# Patient Record
Sex: MALE | Race: AMERICAN INDIAN OR ALASKA NATIVE | NOT HISPANIC OR LATINO | Employment: FULL TIME | URBAN - NONMETROPOLITAN AREA
[De-identification: names, ages, dates, MRNs, and addresses within clinical notes are randomized per-mention and may not be internally consistent; named-entity substitution may affect disease eponyms.]

---

## 2019-03-21 ENCOUNTER — OFFICE VISIT (OUTPATIENT)
Dept: URGENT CARE | Facility: PHYSICIAN GROUP | Age: 39
End: 2019-03-21
Payer: MEDICAID

## 2019-03-21 VITALS
RESPIRATION RATE: 20 BRPM | TEMPERATURE: 97.8 F | HEART RATE: 116 BPM | WEIGHT: 273 LBS | SYSTOLIC BLOOD PRESSURE: 128 MMHG | BODY MASS INDEX: 35.05 KG/M2 | DIASTOLIC BLOOD PRESSURE: 76 MMHG | OXYGEN SATURATION: 98 %

## 2019-03-21 DIAGNOSIS — R68.89 FLU-LIKE SYMPTOMS: ICD-10-CM

## 2019-03-21 DIAGNOSIS — R05.9 COUGH: ICD-10-CM

## 2019-03-21 DIAGNOSIS — B96.89 ACUTE BACTERIAL SINUSITIS: ICD-10-CM

## 2019-03-21 DIAGNOSIS — J01.90 ACUTE BACTERIAL SINUSITIS: ICD-10-CM

## 2019-03-21 DIAGNOSIS — J10.1 INFLUENZA A: ICD-10-CM

## 2019-03-21 LAB
FLUAV+FLUBV AG SPEC QL IA: NORMAL
INT CON NEG: NORMAL
INT CON POS: NORMAL

## 2019-03-21 PROCEDURE — 99204 OFFICE O/P NEW MOD 45 MIN: CPT | Performed by: FAMILY MEDICINE

## 2019-03-21 PROCEDURE — 87804 INFLUENZA ASSAY W/OPTIC: CPT | Performed by: FAMILY MEDICINE

## 2019-03-21 RX ORDER — OSELTAMIVIR PHOSPHATE 75 MG/1
CAPSULE ORAL
Qty: 10 CAP | Refills: 0 | Status: ON HOLD | OUTPATIENT
Start: 2019-03-21 | End: 2019-12-22

## 2019-03-21 RX ORDER — AMOXICILLIN 875 MG/1
TABLET, COATED ORAL
Qty: 20 TAB | Refills: 0 | Status: ON HOLD | OUTPATIENT
Start: 2019-03-21 | End: 2019-12-22

## 2019-03-21 RX ORDER — CODEINE PHOSPHATE AND GUAIFENESIN 10; 100 MG/5ML; MG/5ML
SOLUTION ORAL
Qty: 200 ML | Refills: 0 | Status: SHIPPED | OUTPATIENT
Start: 2019-03-21 | End: 2019-03-29

## 2019-03-21 NOTE — PROGRESS NOTES
Chief Complaint:    Chief Complaint   Patient presents with   • Nasal Congestion     x 3 weeks    • Headache     x 4 days   • Cough     x 1 week        History of Present Illness:    Girlfriend present. This is a new problem. He has been sick for 3 weeks, has worsened in past week. He has had subjective fever, nasal symptoms with purulent mucus from nose, sore throat, and cough productive of purulent mucus. Overall at least moderate severity and getting worse. Nothing has been helpful. Amoxil works/tolerates.       Review of Systems:    Constitutional: See HPI.  Eyes: Negative for change in vision, photophobia, pain, redness, and discharge.  ENT: See HPI.  Respiratory: See HPI.  Cardiovascular: Negative for chest pain, palpitations, orthopnea, claudication, leg swelling, and PND.   Gastrointestinal: Negative for abdominal pain, nausea, vomiting, diarrhea, constipation, blood in stool, and melena.   Genitourinary: Negative for dysuria, urinary urgency, urinary frequency, hematuria, and flank pain.   Musculoskeletal: Negative for myalgias, joint pain, neck pain, and back pain.   Skin: Negative for rash and itching.   Neurological: Negative for dizziness, tingling, tremors, sensory change, speech change, focal weakness, seizures, and loss of consciousness.   Endo: Diabetic, on medication.   Heme: Does not bruise/bleed easily.   Psychiatric/Behavioral: Negative for depression, suicidal ideas, hallucinations, memory loss and substance abuse. The patient is not nervous/anxious and does not have insomnia.        Past Medical History:    Past Medical History:   Diagnosis Date   • Diabetes (HCC)    • Hypercholesteremia    • Hypertension    • Migraine    • Personal history of venous thrombosis and embolism     2004, right leg     Past Surgical History:    Past Surgical History:   Procedure Laterality Date   • INCISION AND DRAINAGE ORTHOPEDIC  2004    right leg   • APPENDECTOMY       Social History:    Social History      Social History   • Marital status: Single     Spouse name: N/A   • Number of children: N/A   • Years of education: N/A     Occupational History   • Not on file.     Social History Main Topics   • Smoking status: Never Smoker   • Smokeless tobacco: Never Used   • Alcohol use Yes      Comment: 4 beers per week   • Drug use: No   • Sexual activity: Not on file     Other Topics Concern   • Not on file     Social History Narrative    ** Merged History Encounter **          Family History:    Family History   Problem Relation Age of Onset   • Diabetes Mother    • Diabetes Father    • Alcohol/Drug Father    • Alcohol/Drug Brother      Medications:    Current Outpatient Prescriptions on File Prior to Visit   Medication Sig Dispense Refill   • metformin (GLUCOPHAGE) 500 MG Tab Take 500 mg by mouth 2 times a day, with meals.     • atenolol (TENORMIN) 50 MG Tab Take 50 mg by mouth every day.     • hydrochlorothiazide (HYDRODIURIL) 25 MG TABS Take 12.5 mg by mouth every day.     • sumatriptan (IMITREX) 50 MG TABS Take 50 mg by mouth Once PRN for Migraine.     • lisinopril (PRINIVIL) 20 MG TABS Take 20 mg by mouth every day.       No current facility-administered medications on file prior to visit.      Allergies:    No Known Allergies      Vitals:    Vitals:    03/21/19 1619   BP: 128/76   Pulse: (!) 116   Resp: 20   Temp: 36.6 °C (97.8 °F)   TempSrc: Temporal   SpO2: 98%   Weight: 123.8 kg (273 lb)       Physical Exam:    Constitutional: Vital signs reviewed. Appears well-developed and well-nourished. Occl cough. No acute distress.   Eyes: Sclera white, conjunctivae clear.   ENT: TTP bilateral maxillary sinus regions. Bilateral nasal congestion and erythematous nasal mucosa. External ears normal. External auditory canals normal without discharge. TMs translucent and non-bulging. Hearing normal. Lips/teeth are normal. Oral mucosa pink and moist. Posterior pharynx and tonsils are moderately erythematous.  Neck: Neck supple.    Cardiovascular: Tachycardic. Regular rhythm. No murmur.  Pulmonary/Chest: Respirations non-labored. Clear to auscultation bilaterally.  Lymph: Cervical nodes without tenderness or enlargement.  Musculoskeletal: Normal gait. Normal range of motion. No muscular atrophy or weakness.  Neurological: Alert and oriented to person, place, and time. Muscle tone normal. Coordination normal.   Skin: No rashes or lesions. Warm, dry, normal turgor.  Psychiatric: Normal mood and affect. Behavior is normal. Judgment and thought content normal.     Diagnostics:    POCT INFLUENZA A/B (Order #[id number]) on 3/21/19   Component Results     Component   Rapid Influenza A-B   positive A    Internal Control Positive   Valid    Internal Control Negative   Valid    Last Resulted Time   Thu Mar 21, 2019  4:56 PM       Assessment / Plan:    1. Flu-like symptoms  - POCT Influenza A/B    2. Influenza A  - oseltamivir (TAMIFLU) 75 MG Cap; 1 CAP BY MOUTH TWICE A DAY X 5 DAYS.  Dispense: 10 Cap; Refill: 0    3. Acute bacterial sinusitis  - amoxicillin (AMOXIL) 875 MG tablet; 1 TAB TWICE A DAY X 10 DAYS.  Dispense: 20 Tab; Refill: 0    4. Cough  - guaifenesin-codeine (ROBITUSSIN AC) Solution oral solution; 5-10 ML (1-2 TEASPOONS) EVERY 6 HOURS ONLY IF NEEDED FOR COUGH FOR UP TO 7 DAYS. MAY CAUSE DROWSINESS.  Dispense: 200 mL; Refill: 0      Discussed with them DDX, management options, and risks, benefits, and alternatives to treatment plan agreed upon.    Agreeable to medications prescribed.  report checked - last Rx was Hydrocodone-APAP 5-325 mg #12 on 12/19/17.    Discussed expected course of duration, time for improvement, and to seek follow-up in Emergency Room, urgent care, or with PCP if getting worse at any time or not improving within expected time frame.

## 2019-12-22 ENCOUNTER — APPOINTMENT (OUTPATIENT)
Dept: RADIOLOGY | Facility: MEDICAL CENTER | Age: 39
DRG: 392 | End: 2019-12-22
Attending: EMERGENCY MEDICINE
Payer: MEDICAID

## 2019-12-22 ENCOUNTER — HOSPITAL ENCOUNTER (INPATIENT)
Facility: MEDICAL CENTER | Age: 39
LOS: 1 days | DRG: 392 | End: 2019-12-24
Attending: EMERGENCY MEDICINE | Admitting: HOSPITALIST
Payer: MEDICAID

## 2019-12-22 DIAGNOSIS — R73.9 HYPERGLYCEMIA: ICD-10-CM

## 2019-12-22 DIAGNOSIS — R74.8 ELEVATED LIPASE: ICD-10-CM

## 2019-12-22 DIAGNOSIS — K80.20 CALCULUS OF GALLBLADDER WITHOUT CHOLECYSTITIS WITHOUT OBSTRUCTION: ICD-10-CM

## 2019-12-22 DIAGNOSIS — R10.11 RIGHT UPPER QUADRANT ABDOMINAL PAIN: ICD-10-CM

## 2019-12-22 DIAGNOSIS — K62.5 RECTAL BLEEDING: ICD-10-CM

## 2019-12-22 PROBLEM — I10 HTN (HYPERTENSION): Status: ACTIVE | Noted: 2019-12-22

## 2019-12-22 PROBLEM — E11.9 T2DM (TYPE 2 DIABETES MELLITUS) (HCC): Status: ACTIVE | Noted: 2019-12-22

## 2019-12-22 LAB
ALBUMIN SERPL BCP-MCNC: 3.7 G/DL (ref 3.2–4.9)
ALBUMIN/GLOB SERPL: 1.6 G/DL
ALP SERPL-CCNC: 129 U/L (ref 30–99)
ALT SERPL-CCNC: 28 U/L (ref 2–50)
ANION GAP SERPL CALC-SCNC: 7 MMOL/L (ref 0–11.9)
AST SERPL-CCNC: 14 U/L (ref 12–45)
BASOPHILS # BLD AUTO: 0.2 % (ref 0–1.8)
BASOPHILS # BLD: 0.01 K/UL (ref 0–0.12)
BILIRUB SERPL-MCNC: 0.6 MG/DL (ref 0.1–1.5)
BUN SERPL-MCNC: 8 MG/DL (ref 8–22)
CALCIUM SERPL-MCNC: 8.8 MG/DL (ref 8.5–10.5)
CHLORIDE SERPL-SCNC: 102 MMOL/L (ref 96–112)
CO2 SERPL-SCNC: 26 MMOL/L (ref 20–33)
CREAT SERPL-MCNC: 0.62 MG/DL (ref 0.5–1.4)
EOSINOPHIL # BLD AUTO: 0.08 K/UL (ref 0–0.51)
EOSINOPHIL NFR BLD: 1.2 % (ref 0–6.9)
ERYTHROCYTE [DISTWIDTH] IN BLOOD BY AUTOMATED COUNT: 39 FL (ref 35.9–50)
ETHANOL BLD-MCNC: 0 G/DL
GLOBULIN SER CALC-MCNC: 2.3 G/DL (ref 1.9–3.5)
GLUCOSE SERPL-MCNC: 320 MG/DL (ref 65–99)
HCT VFR BLD AUTO: 45.9 % (ref 42–52)
HGB BLD-MCNC: 15.9 G/DL (ref 14–18)
IMM GRANULOCYTES # BLD AUTO: 0.02 K/UL (ref 0–0.11)
IMM GRANULOCYTES NFR BLD AUTO: 0.3 % (ref 0–0.9)
LACTATE BLD-SCNC: 2 MMOL/L (ref 0.5–2)
LIPASE SERPL-CCNC: 125 U/L (ref 11–82)
LYMPHOCYTES # BLD AUTO: 1.85 K/UL (ref 1–4.8)
LYMPHOCYTES NFR BLD: 28.8 % (ref 22–41)
MCH RBC QN AUTO: 29.2 PG (ref 27–33)
MCHC RBC AUTO-ENTMCNC: 34.6 G/DL (ref 33.7–35.3)
MCV RBC AUTO: 84.4 FL (ref 81.4–97.8)
MONOCYTES # BLD AUTO: 0.3 K/UL (ref 0–0.85)
MONOCYTES NFR BLD AUTO: 4.7 % (ref 0–13.4)
NEUTROPHILS # BLD AUTO: 4.16 K/UL (ref 1.82–7.42)
NEUTROPHILS NFR BLD: 64.8 % (ref 44–72)
NRBC # BLD AUTO: 0 K/UL
NRBC BLD-RTO: 0 /100 WBC
PLATELET # BLD AUTO: 225 K/UL (ref 164–446)
PMV BLD AUTO: 10.1 FL (ref 9–12.9)
POTASSIUM SERPL-SCNC: 3.6 MMOL/L (ref 3.6–5.5)
PROT SERPL-MCNC: 6 G/DL (ref 6–8.2)
RBC # BLD AUTO: 5.44 M/UL (ref 4.7–6.1)
SODIUM SERPL-SCNC: 135 MMOL/L (ref 135–145)
WBC # BLD AUTO: 6.4 K/UL (ref 4.8–10.8)

## 2019-12-22 PROCEDURE — 96374 THER/PROPH/DIAG INJ IV PUSH: CPT | Mod: XU

## 2019-12-22 PROCEDURE — 700105 HCHG RX REV CODE 258: Performed by: EMERGENCY MEDICINE

## 2019-12-22 PROCEDURE — G0378 HOSPITAL OBSERVATION PER HR: HCPCS

## 2019-12-22 PROCEDURE — 74177 CT ABD & PELVIS W/CONTRAST: CPT

## 2019-12-22 PROCEDURE — 99220 PR INITIAL OBSERVATION CARE,LEVL III: CPT | Performed by: HOSPITALIST

## 2019-12-22 PROCEDURE — 83605 ASSAY OF LACTIC ACID: CPT

## 2019-12-22 PROCEDURE — 700111 HCHG RX REV CODE 636 W/ 250 OVERRIDE (IP): Performed by: EMERGENCY MEDICINE

## 2019-12-22 PROCEDURE — 83690 ASSAY OF LIPASE: CPT

## 2019-12-22 PROCEDURE — 80307 DRUG TEST PRSMV CHEM ANLYZR: CPT

## 2019-12-22 PROCEDURE — 96375 TX/PRO/DX INJ NEW DRUG ADDON: CPT

## 2019-12-22 PROCEDURE — 700117 HCHG RX CONTRAST REV CODE 255: Performed by: EMERGENCY MEDICINE

## 2019-12-22 PROCEDURE — 80053 COMPREHEN METABOLIC PANEL: CPT

## 2019-12-22 PROCEDURE — 94760 N-INVAS EAR/PLS OXIMETRY 1: CPT

## 2019-12-22 PROCEDURE — 76705 ECHO EXAM OF ABDOMEN: CPT

## 2019-12-22 PROCEDURE — 700102 HCHG RX REV CODE 250 W/ 637 OVERRIDE(OP): Performed by: HOSPITALIST

## 2019-12-22 PROCEDURE — A9270 NON-COVERED ITEM OR SERVICE: HCPCS | Performed by: HOSPITALIST

## 2019-12-22 PROCEDURE — 85025 COMPLETE CBC W/AUTO DIFF WBC: CPT

## 2019-12-22 PROCEDURE — 82962 GLUCOSE BLOOD TEST: CPT

## 2019-12-22 PROCEDURE — 99285 EMERGENCY DEPT VISIT HI MDM: CPT

## 2019-12-22 PROCEDURE — 700105 HCHG RX REV CODE 258: Performed by: HOSPITALIST

## 2019-12-22 RX ORDER — MORPHINE SULFATE 4 MG/ML
2 INJECTION, SOLUTION INTRAMUSCULAR; INTRAVENOUS
Status: DISCONTINUED | OUTPATIENT
Start: 2019-12-22 | End: 2019-12-24 | Stop reason: HOSPADM

## 2019-12-22 RX ORDER — OXYCODONE HYDROCHLORIDE 5 MG/1
5 TABLET ORAL
Status: DISCONTINUED | OUTPATIENT
Start: 2019-12-22 | End: 2019-12-24 | Stop reason: HOSPADM

## 2019-12-22 RX ORDER — PROMETHAZINE HYDROCHLORIDE 25 MG/1
12.5-25 TABLET ORAL EVERY 4 HOURS PRN
Status: DISCONTINUED | OUTPATIENT
Start: 2019-12-22 | End: 2019-12-24 | Stop reason: HOSPADM

## 2019-12-22 RX ORDER — HYDROCHLOROTHIAZIDE 12.5 MG/1
12.5 TABLET ORAL DAILY
Status: DISCONTINUED | OUTPATIENT
Start: 2019-12-23 | End: 2019-12-24 | Stop reason: HOSPADM

## 2019-12-22 RX ORDER — ONDANSETRON 2 MG/ML
4 INJECTION INTRAMUSCULAR; INTRAVENOUS ONCE
Status: COMPLETED | OUTPATIENT
Start: 2019-12-22 | End: 2019-12-22

## 2019-12-22 RX ORDER — ATORVASTATIN CALCIUM 20 MG/1
20 TABLET, FILM COATED ORAL NIGHTLY
Status: ON HOLD | COMMUNITY
End: 2019-12-24

## 2019-12-22 RX ORDER — PROMETHAZINE HYDROCHLORIDE 25 MG/1
12.5-25 SUPPOSITORY RECTAL EVERY 4 HOURS PRN
Status: DISCONTINUED | OUTPATIENT
Start: 2019-12-22 | End: 2019-12-24 | Stop reason: HOSPADM

## 2019-12-22 RX ORDER — MORPHINE SULFATE 4 MG/ML
4 INJECTION, SOLUTION INTRAMUSCULAR; INTRAVENOUS ONCE
Status: COMPLETED | OUTPATIENT
Start: 2019-12-22 | End: 2019-12-22

## 2019-12-22 RX ORDER — ONDANSETRON 4 MG/1
4 TABLET, ORALLY DISINTEGRATING ORAL EVERY 4 HOURS PRN
Status: DISCONTINUED | OUTPATIENT
Start: 2019-12-22 | End: 2019-12-24 | Stop reason: HOSPADM

## 2019-12-22 RX ORDER — SODIUM CHLORIDE, SODIUM LACTATE, POTASSIUM CHLORIDE, CALCIUM CHLORIDE 600; 310; 30; 20 MG/100ML; MG/100ML; MG/100ML; MG/100ML
2000 INJECTION, SOLUTION INTRAVENOUS ONCE
Status: COMPLETED | OUTPATIENT
Start: 2019-12-22 | End: 2019-12-23

## 2019-12-22 RX ORDER — ONDANSETRON 2 MG/ML
4 INJECTION INTRAMUSCULAR; INTRAVENOUS EVERY 4 HOURS PRN
Status: DISCONTINUED | OUTPATIENT
Start: 2019-12-22 | End: 2019-12-24 | Stop reason: HOSPADM

## 2019-12-22 RX ORDER — LISINOPRIL 20 MG/1
20 TABLET ORAL DAILY
Status: DISCONTINUED | OUTPATIENT
Start: 2019-12-23 | End: 2019-12-24 | Stop reason: HOSPADM

## 2019-12-22 RX ORDER — POLYETHYLENE GLYCOL 3350 17 G/17G
1 POWDER, FOR SOLUTION ORAL
Status: DISCONTINUED | OUTPATIENT
Start: 2019-12-22 | End: 2019-12-24 | Stop reason: HOSPADM

## 2019-12-22 RX ORDER — AMOXICILLIN 250 MG
2 CAPSULE ORAL 2 TIMES DAILY
Status: DISCONTINUED | OUTPATIENT
Start: 2019-12-22 | End: 2019-12-24 | Stop reason: HOSPADM

## 2019-12-22 RX ORDER — OXYCODONE HYDROCHLORIDE 5 MG/1
2.5 TABLET ORAL
Status: DISCONTINUED | OUTPATIENT
Start: 2019-12-22 | End: 2019-12-24 | Stop reason: HOSPADM

## 2019-12-22 RX ORDER — PROCHLORPERAZINE EDISYLATE 5 MG/ML
5-10 INJECTION INTRAMUSCULAR; INTRAVENOUS EVERY 4 HOURS PRN
Status: DISCONTINUED | OUTPATIENT
Start: 2019-12-22 | End: 2019-12-24 | Stop reason: HOSPADM

## 2019-12-22 RX ORDER — SODIUM CHLORIDE 9 MG/ML
1000 INJECTION, SOLUTION INTRAVENOUS ONCE
Status: COMPLETED | OUTPATIENT
Start: 2019-12-22 | End: 2019-12-22

## 2019-12-22 RX ORDER — SODIUM CHLORIDE 9 MG/ML
INJECTION, SOLUTION INTRAVENOUS CONTINUOUS
Status: DISCONTINUED | OUTPATIENT
Start: 2019-12-22 | End: 2019-12-24 | Stop reason: HOSPADM

## 2019-12-22 RX ORDER — BISACODYL 10 MG
10 SUPPOSITORY, RECTAL RECTAL
Status: DISCONTINUED | OUTPATIENT
Start: 2019-12-22 | End: 2019-12-24 | Stop reason: HOSPADM

## 2019-12-22 RX ORDER — HYDROMORPHONE HYDROCHLORIDE 1 MG/ML
0.5 INJECTION, SOLUTION INTRAMUSCULAR; INTRAVENOUS; SUBCUTANEOUS ONCE
Status: COMPLETED | OUTPATIENT
Start: 2019-12-22 | End: 2019-12-22

## 2019-12-22 RX ORDER — METFORMIN HYDROCHLORIDE 500 MG/1
500 TABLET, EXTENDED RELEASE ORAL DAILY
COMMUNITY

## 2019-12-22 RX ORDER — ATENOLOL 50 MG/1
50 TABLET ORAL DAILY
Status: DISCONTINUED | OUTPATIENT
Start: 2019-12-23 | End: 2019-12-24 | Stop reason: HOSPADM

## 2019-12-22 RX ADMIN — IOHEXOL 100 ML: 350 INJECTION, SOLUTION INTRAVENOUS at 19:26

## 2019-12-22 RX ADMIN — MORPHINE SULFATE 4 MG: 4 INJECTION INTRAVENOUS at 17:48

## 2019-12-22 RX ADMIN — SODIUM CHLORIDE, POTASSIUM CHLORIDE, SODIUM LACTATE AND CALCIUM CHLORIDE 2000 ML: 600; 310; 30; 20 INJECTION, SOLUTION INTRAVENOUS at 21:00

## 2019-12-22 RX ADMIN — SODIUM CHLORIDE 1000 ML: 9 INJECTION, SOLUTION INTRAVENOUS at 18:49

## 2019-12-22 RX ADMIN — OXYCODONE HYDROCHLORIDE 5 MG: 5 TABLET ORAL at 23:04

## 2019-12-22 RX ADMIN — HYDROMORPHONE HYDROCHLORIDE 0.5 MG: 1 INJECTION, SOLUTION INTRAMUSCULAR; INTRAVENOUS; SUBCUTANEOUS at 19:58

## 2019-12-22 RX ADMIN — ONDANSETRON 4 MG: 2 INJECTION INTRAMUSCULAR; INTRAVENOUS at 17:48

## 2019-12-22 RX ADMIN — SENNOSIDES AND DOCUSATE SODIUM 2 TABLET: 8.6; 5 TABLET ORAL at 21:09

## 2019-12-22 ASSESSMENT — ENCOUNTER SYMPTOMS
DEPRESSION: 0
DIARRHEA: 0
HEADACHES: 0
CHILLS: 0
VOMITING: 1
DIZZINESS: 0
PHOTOPHOBIA: 0
TINGLING: 0
COUGH: 0
SORE THROAT: 0
FEVER: 0
ABDOMINAL PAIN: 1
WHEEZING: 0
MYALGIAS: 0
NAUSEA: 1
FOCAL WEAKNESS: 0
SHORTNESS OF BREATH: 0
PALPITATIONS: 0

## 2019-12-22 ASSESSMENT — LIFESTYLE VARIABLES
TOTAL SCORE: 0
HAVE PEOPLE ANNOYED YOU BY CRITICIZING YOUR DRINKING: NO
EVER HAD A DRINK FIRST THING IN THE MORNING TO STEADY YOUR NERVES TO GET RID OF A HANGOVER: NO
TOTAL SCORE: 0
ON A TYPICAL DAY WHEN YOU DRINK ALCOHOL HOW MANY DRINKS DO YOU HAVE: 0
AVERAGE NUMBER OF DAYS PER WEEK YOU HAVE A DRINK CONTAINING ALCOHOL: 0
CONSUMPTION TOTAL: NEGATIVE
ON A TYPICAL DAY WHEN YOU DRINK ALCOHOL HOW MANY DRINKS DO YOU HAVE: 0
EVER HAD A DRINK FIRST THING IN THE MORNING TO STEADY YOUR NERVES TO GET RID OF A HANGOVER: NO
HAVE YOU EVER FELT YOU SHOULD CUT DOWN ON YOUR DRINKING: NO
HOW MANY TIMES IN THE PAST YEAR HAVE YOU HAD 5 OR MORE DRINKS IN A DAY: 0
HOW MANY TIMES IN THE PAST YEAR HAVE YOU HAD 5 OR MORE DRINKS IN A DAY: 0
DOES PATIENT WANT TO STOP DRINKING: NO
EVER FELT BAD OR GUILTY ABOUT YOUR DRINKING: NO
EVER_SMOKED: YES
PACK_YEARS: 2 MONTHS
ALCOHOL_USE: NO
TOTAL SCORE: 0
DOES PATIENT WANT TO STOP DRINKING: NO
HAVE PEOPLE ANNOYED YOU BY CRITICIZING YOUR DRINKING: NO
TOTAL SCORE: 0
EVER FELT BAD OR GUILTY ABOUT YOUR DRINKING: NO
AVERAGE NUMBER OF DAYS PER WEEK YOU HAVE A DRINK CONTAINING ALCOHOL: 0
CONSUMPTION TOTAL: NEGATIVE
HAVE YOU EVER FELT YOU SHOULD CUT DOWN ON YOUR DRINKING: NO
DO YOU DRINK ALCOHOL: NO

## 2019-12-22 ASSESSMENT — PAIN DESCRIPTION - DESCRIPTORS: DESCRIPTORS: SHOOTING

## 2019-12-22 ASSESSMENT — PATIENT HEALTH QUESTIONNAIRE - PHQ9
2. FEELING DOWN, DEPRESSED, IRRITABLE, OR HOPELESS: NOT AT ALL
1. LITTLE INTEREST OR PLEASURE IN DOING THINGS: NOT AT ALL
SUM OF ALL RESPONSES TO PHQ9 QUESTIONS 1 AND 2: 0

## 2019-12-22 ASSESSMENT — PAIN SCALES - WONG BAKER
WONGBAKER_NUMERICALRESPONSE: HURTS JUST A LITTLE BIT
WONGBAKER_NUMERICALRESPONSE: HURTS A WHOLE LOT
WONGBAKER_NUMERICALRESPONSE: HURTS EVEN MORE

## 2019-12-23 ENCOUNTER — APPOINTMENT (OUTPATIENT)
Dept: RADIOLOGY | Facility: MEDICAL CENTER | Age: 39
DRG: 392 | End: 2019-12-23
Attending: HOSPITALIST
Payer: MEDICAID

## 2019-12-23 PROBLEM — E66.812 CLASS 2 SEVERE OBESITY WITH SERIOUS COMORBIDITY IN ADULT (HCC): Status: ACTIVE | Noted: 2019-12-23

## 2019-12-23 PROBLEM — E66.01 CLASS 2 SEVERE OBESITY WITH SERIOUS COMORBIDITY IN ADULT (HCC): Status: ACTIVE | Noted: 2019-12-23

## 2019-12-23 LAB
ALBUMIN SERPL BCP-MCNC: 3.2 G/DL (ref 3.2–4.9)
ALBUMIN/GLOB SERPL: 1.5 G/DL
ALP SERPL-CCNC: 90 U/L (ref 30–99)
ALT SERPL-CCNC: 23 U/L (ref 2–50)
ANION GAP SERPL CALC-SCNC: 7 MMOL/L (ref 0–11.9)
AST SERPL-CCNC: 10 U/L (ref 12–45)
BILIRUB SERPL-MCNC: 0.7 MG/DL (ref 0.1–1.5)
BUN SERPL-MCNC: 7 MG/DL (ref 8–22)
CALCIUM SERPL-MCNC: 8.1 MG/DL (ref 8.5–10.5)
CHLORIDE SERPL-SCNC: 104 MMOL/L (ref 96–112)
CHOLEST SERPL-MCNC: 92 MG/DL (ref 100–199)
CO2 SERPL-SCNC: 26 MMOL/L (ref 20–33)
CREAT SERPL-MCNC: 0.61 MG/DL (ref 0.5–1.4)
ERYTHROCYTE [DISTWIDTH] IN BLOOD BY AUTOMATED COUNT: 39.6 FL (ref 35.9–50)
EST. AVERAGE GLUCOSE BLD GHB EST-MCNC: 252 MG/DL
GLOBULIN SER CALC-MCNC: 2.2 G/DL (ref 1.9–3.5)
GLUCOSE BLD-MCNC: 130 MG/DL (ref 65–99)
GLUCOSE BLD-MCNC: 136 MG/DL (ref 65–99)
GLUCOSE BLD-MCNC: 143 MG/DL (ref 65–99)
GLUCOSE BLD-MCNC: 149 MG/DL (ref 65–99)
GLUCOSE BLD-MCNC: 169 MG/DL (ref 65–99)
GLUCOSE SERPL-MCNC: 147 MG/DL (ref 65–99)
HBA1C MFR BLD: 10.4 % (ref 0–5.6)
HCT VFR BLD AUTO: 41.4 % (ref 42–52)
HDLC SERPL-MCNC: 25 MG/DL
HGB BLD-MCNC: 14.4 G/DL (ref 14–18)
LDLC SERPL CALC-MCNC: 23 MG/DL
MCH RBC QN AUTO: 29.4 PG (ref 27–33)
MCHC RBC AUTO-ENTMCNC: 34.8 G/DL (ref 33.7–35.3)
MCV RBC AUTO: 84.7 FL (ref 81.4–97.8)
PLATELET # BLD AUTO: 204 K/UL (ref 164–446)
PMV BLD AUTO: 9.8 FL (ref 9–12.9)
POTASSIUM SERPL-SCNC: 3.5 MMOL/L (ref 3.6–5.5)
PROT SERPL-MCNC: 5.4 G/DL (ref 6–8.2)
RBC # BLD AUTO: 4.89 M/UL (ref 4.7–6.1)
SODIUM SERPL-SCNC: 137 MMOL/L (ref 135–145)
TRIGL SERPL-MCNC: 219 MG/DL (ref 0–149)
WBC # BLD AUTO: 5.7 K/UL (ref 4.8–10.8)

## 2019-12-23 PROCEDURE — 700105 HCHG RX REV CODE 258: Performed by: FAMILY MEDICINE

## 2019-12-23 PROCEDURE — 700102 HCHG RX REV CODE 250 W/ 637 OVERRIDE(OP): Performed by: HOSPITALIST

## 2019-12-23 PROCEDURE — 96376 TX/PRO/DX INJ SAME DRUG ADON: CPT

## 2019-12-23 PROCEDURE — 80053 COMPREHEN METABOLIC PANEL: CPT

## 2019-12-23 PROCEDURE — 700105 HCHG RX REV CODE 258: Performed by: EMERGENCY MEDICINE

## 2019-12-23 PROCEDURE — A9270 NON-COVERED ITEM OR SERVICE: HCPCS | Performed by: HOSPITALIST

## 2019-12-23 PROCEDURE — 99233 SBSQ HOSP IP/OBS HIGH 50: CPT | Performed by: HOSPITALIST

## 2019-12-23 PROCEDURE — 36415 COLL VENOUS BLD VENIPUNCTURE: CPT

## 2019-12-23 PROCEDURE — 80061 LIPID PANEL: CPT

## 2019-12-23 PROCEDURE — 700111 HCHG RX REV CODE 636 W/ 250 OVERRIDE (IP): Performed by: HOSPITALIST

## 2019-12-23 PROCEDURE — 74181 MRI ABDOMEN W/O CONTRAST: CPT

## 2019-12-23 PROCEDURE — 83036 HEMOGLOBIN GLYCOSYLATED A1C: CPT

## 2019-12-23 PROCEDURE — 82962 GLUCOSE BLOOD TEST: CPT | Mod: 91

## 2019-12-23 PROCEDURE — 85027 COMPLETE CBC AUTOMATED: CPT

## 2019-12-23 PROCEDURE — G0378 HOSPITAL OBSERVATION PER HR: HCPCS

## 2019-12-23 RX ORDER — ACETAMINOPHEN 325 MG/1
650 TABLET ORAL EVERY 4 HOURS PRN
Status: DISCONTINUED | OUTPATIENT
Start: 2019-12-23 | End: 2019-12-24 | Stop reason: HOSPADM

## 2019-12-23 RX ADMIN — OXYCODONE HYDROCHLORIDE 5 MG: 5 TABLET ORAL at 20:49

## 2019-12-23 RX ADMIN — SODIUM CHLORIDE: 9 INJECTION, SOLUTION INTRAVENOUS at 20:42

## 2019-12-23 RX ADMIN — SODIUM CHLORIDE: 9 INJECTION, SOLUTION INTRAVENOUS at 16:36

## 2019-12-23 RX ADMIN — OXYCODONE HYDROCHLORIDE 5 MG: 5 TABLET ORAL at 16:38

## 2019-12-23 RX ADMIN — SODIUM CHLORIDE: 9 INJECTION, SOLUTION INTRAVENOUS at 06:20

## 2019-12-23 RX ADMIN — SODIUM CHLORIDE: 9 INJECTION, SOLUTION INTRAVENOUS at 12:25

## 2019-12-23 RX ADMIN — OXYCODONE HYDROCHLORIDE 5 MG: 5 TABLET ORAL at 09:17

## 2019-12-23 RX ADMIN — ONDANSETRON 4 MG: 2 INJECTION INTRAMUSCULAR; INTRAVENOUS at 20:49

## 2019-12-23 RX ADMIN — ONDANSETRON 4 MG: 2 INJECTION INTRAMUSCULAR; INTRAVENOUS at 09:17

## 2019-12-23 ASSESSMENT — ENCOUNTER SYMPTOMS
COUGH: 0
MEMORY LOSS: 0
SORE THROAT: 0
VOMITING: 0
EYE DISCHARGE: 0
FEVER: 0
EYE PAIN: 0
BACK PAIN: 0
HEADACHES: 0
NAUSEA: 1
MYALGIAS: 0
DIZZINESS: 0
ABDOMINAL PAIN: 1
DEPRESSION: 0
SHORTNESS OF BREATH: 0
BLOOD IN STOOL: 0
CHILLS: 0

## 2019-12-23 ASSESSMENT — PAIN SCALES - WONG BAKER: WONGBAKER_NUMERICALRESPONSE: DOESN'T HURT AT ALL

## 2019-12-23 NOTE — ED PROVIDER NOTES
ED Provider Note    CHIEF COMPLAINT  Chief Complaint   Patient presents with   • RUQ Pain       HPI  Burke Gorman is a 39 y.o. male who presents to the emergency department complaining of right upper quadrant abdominal pain.  This is going on for about 2 days now the patient does not recall any specific precipitating events.  Approximately 18 hours ago the patient said he had a very dark bloody-looking bowel movement but no subsequent bloody bowel movements.  He says that he currently does not drink alcohol he does not recall any exacerbating or alleviating factors or precipitating events.    REVIEW OF SYSTEMS no chest pain no difficulty breathing no hemoptysis no fever or chills.  All other systems negative    PAST MEDICAL HISTORY  Past Medical History:   Diagnosis Date   • Diabetes (HCC)    • Hypercholesteremia    • Hypertension    • Migraine    • Personal history of venous thrombosis and embolism     2004, right leg       FAMILY HISTORY  Family History   Problem Relation Age of Onset   • Diabetes Mother    • Diabetes Father    • Alcohol/Drug Father    • Alcohol/Drug Brother        SOCIAL HISTORY  Social History     Socioeconomic History   • Marital status: Single     Spouse name: Not on file   • Number of children: Not on file   • Years of education: Not on file   • Highest education level: Not on file   Occupational History   • Not on file   Social Needs   • Financial resource strain: Not on file   • Food insecurity:     Worry: Not on file     Inability: Not on file   • Transportation needs:     Medical: Not on file     Non-medical: Not on file   Tobacco Use   • Smoking status: Never Smoker   • Smokeless tobacco: Never Used   Substance and Sexual Activity   • Alcohol use: Yes     Comment: 4 beers per week   • Drug use: No   • Sexual activity: Not on file   Lifestyle   • Physical activity:     Days per week: Not on file     Minutes per session: Not on file   • Stress: Not on file   Relationships   • Social  "connections:     Talks on phone: Not on file     Gets together: Not on file     Attends Yazidi service: Not on file     Active member of club or organization: Not on file     Attends meetings of clubs or organizations: Not on file     Relationship status: Not on file   • Intimate partner violence:     Fear of current or ex partner: Not on file     Emotionally abused: Not on file     Physically abused: Not on file     Forced sexual activity: Not on file   Other Topics Concern   • Not on file   Social History Narrative    ** Merged History Encounter **            SURGICAL HISTORY  Past Surgical History:   Procedure Laterality Date   • INCISION AND DRAINAGE ORTHOPEDIC  2004    right leg   • APPENDECTOMY         CURRENT MEDICATIONS  Home Medications     Reviewed by Talia Dow R.N. (Registered Nurse) on 12/22/19 at 2051  Med List Status: Complete   Medication Last Dose Status   atenolol (TENORMIN) 50 MG Tab 12/22/2019 Active   atorvastatin (LIPITOR) 20 MG Tab 12/22/2019 Active   hydrochlorothiazide (HYDRODIURIL) 25 MG TABS 12/22/2019 Active   lisinopril (PRINIVIL) 20 MG TABS 12/22/2019 Active   metFORMIN ER (GLUCOPHAGE XR) 500 MG TABLET SR 24 HR 12/22/2019 Active   vitamin D (CHOLECALCIFEROL) 1000 UNIT Tab 12/22/2019 Active                ALLERGIES  No Known Allergies    PHYSICAL EXAM  VITAL SIGNS: /87   Pulse 72   Temp 36.2 °C (97.2 °F) (Temporal)   Resp 19   Ht 1.88 m (6' 2\")   Wt (!) 125.1 kg (275 lb 12.7 oz)   SpO2 92%   BMI 35.41 kg/m²    Oxygen saturation is interpreted as adequate  Constitutional: Awake and uncomfortable appearing  HENT: Mucous membranes are dry  Eyes: No erythema discharge or jaundice  Neck: Trachea midline no JVD  Cardiovascular: Regular rate and rhythm  Lungs: Clear and equal bilaterally with no apparent difficulty breathing  Abdomen/Back: Moderately obese soft bowel sounds present the patient is very tender in the right upper quadrant but also diffusely tender " throughout the rest of the abdomen.  The abdomen is not rigid.    Skin: Warm and dry  Musculoskeletal: No acute bony deformity no leg edema or calf tenderness  Neurologic: Awake verbal and moving all extremities    Laboratory  CBC shows white blood cell count of 6.4 hemoglobin is adequate at 15.9 basic metabolic panel shows an elevated blood glucose of 320 alk phos minimally elevated at 129 lipase is minimally elevated at 125 lactic acid is at the upper limits of normal at 2.0    Radiology  CT-ABDOMEN-PELVIS WITH   Final Result         1. No CT evidence of acute inflammatory change in the abdomen or pelvis.      2. Cholelithiasis.      3. Hepatic steatosis.      US-RUQ   Final Result         Limited exam due to body habitus.      1. Cholelithiasis. No sonographic evidence for acute cholecystitis.      2. Echogenic liver parenchyma, likely hepatic steatosis.         [id number]    (Results Pending)       MEDICAL DECISION MAKING and DISPOSITION  In the emergency department an IV was established the patient was initially given intravenous morphine which she said was not very helpful and subsequently given intravenous Dilaudid.  He was given intravenous Zofran for nausea.  Because of elevated lipase and glucose the patient was kept n.p.o. and not given an oral fluid challenge and he was given intravenous fluids for hydration.  Reevaluation shows he remains hemodynamically stable.  I reviewed the findings thus far available with the patient and his wife.  The patient is going to require further evaluation and treatment I reviewed the case with the hospitalist the patient is referred to the hospitalist service for further evaluation and treatment    IMPRESSION  1.  Abdominal pain  2.  Cholelithiasis  3.  Diabetic with hyperglycemia  4.  Elevated lipase         Electronically signed by: Sergei Powers, 12/22/2019 8:52 PM

## 2019-12-23 NOTE — ED TRIAGE NOTES
"38 YO M presents to ED via EMS with complaints up severe RUQ pain that became unbearable today. Pt states he has had dark stools last night with blood clots and dark urine. Says he has been drinking water. Pt is weak, tired, and feels \"miserable\". Pt has diabetes, HTN, pt alert and calm.   Past Medical History:   Diagnosis Date   • Diabetes (HCC)    • Hypercholesteremia    • Hypertension    • Migraine    • Personal history of venous thrombosis and embolism     2004, right leg   '  "

## 2019-12-23 NOTE — ASSESSMENT & PLAN NOTE
Patient n.p.o.    Holding long-acting hypoglycemics    Monitor with Accu-Cheks and cover with insulin sliding scale

## 2019-12-23 NOTE — CARE PLAN
Problem: Pain Management  Goal: Pain level will decrease to patient's comfort goal  Outcome: PROGRESSING AS EXPECTED     Problem: Infection  Goal: Will remain free from infection  Outcome: PROGRESSING AS EXPECTED

## 2019-12-23 NOTE — ASSESSMENT & PLAN NOTE
Suspect early pancreatitis    Etiology unclear    Patient no longer drinks alcohol     CT scan of the abdomen shows no inflammatory changes, no ductal dilatation, no cholecystitis but he does have cholelithiasis   LFTs are normal with the exception of an elevated alk phos level     MRCP shows no obstructing stone and normal pancreas    Symptom management  NPO  IV fluids at 250ml  HIDA scan pending

## 2019-12-23 NOTE — PROGRESS NOTES
Received from  [facility], aox4, steady on his feet. With RUQ pain 5/10 on exertion, denies sob. Call light within reach. Needs attended. Plan of care discussed and understood.

## 2019-12-23 NOTE — H&P
Hospital Medicine History & Physical Note    Date of Service  12/22/2019    Primary Care Physician  Pcp Pt States None    Consultants  None    Code Status  Full    Chief Complaint  Chief Complaint   Patient presents with   • RUQ Pain       History of Presenting Illness  39 y.o. male who presented on 12/22/2019 with abdominal pain.  This is a pleasant gentleman who comes in with complaints of right upper quadrant abdominal pain.  His symptoms began 2 days ago and was not severe at that point therefore he did not seek immediate medical assistance.  He denies any recent changes in his diet, no camping, hiking, foreign travel, or antibiotic use.  Yesterday, he had a dark looking bowel movement but nothing since.  His pain is constant at this point, nonradiating, cramping in nature, and has been associated with nausea but no vomiting.  He has no alleviating or aggravating factors to his pain.  Otherwise prior to this, the patient states that he was in his usual state of health, no fevers, chills, diarrhea or dysuria.  No shortness of breath or chest pain.  Review of the medical record shows that his last admission to the hospital was in 2012.    Review of Systems  Review of Systems   Constitutional: Negative for chills and fever.   HENT: Negative for congestion and sore throat.    Eyes: Negative for photophobia.   Respiratory: Negative for cough, shortness of breath and wheezing.    Cardiovascular: Negative for chest pain and palpitations.   Gastrointestinal: Positive for abdominal pain, nausea and vomiting. Negative for diarrhea.   Genitourinary: Negative for dysuria.   Musculoskeletal: Negative for myalgias.   Skin: Negative.    Neurological: Negative for dizziness, tingling, focal weakness and headaches.   Psychiatric/Behavioral: Negative for depression and suicidal ideas.       Past Medical History  Past Medical History:   Diagnosis Date   • Diabetes (HCC)    • Hypercholesteremia    • Hypertension    • Migraine    •  Personal history of venous thrombosis and embolism     , right leg       Surgical History  Past Surgical History:   Procedure Laterality Date   • INCISION AND DRAINAGE ORTHOPEDIC      right leg   • APPENDECTOMY         Family History  Family History   Problem Relation Age of Onset   • Diabetes Mother    • Diabetes Father    • Alcohol/Drug Father    • Alcohol/Drug Brother        Social History  Social History     Tobacco Use   • Smoking status: Never Smoker   • Smokeless tobacco: Never Used   Substance Use Topics   • Alcohol use: Yes     Comment: 4 beers per week   • Drug use: No       Allergies  No Known Allergies    Medications  No current facility-administered medications on file prior to encounter.      Current Outpatient Medications on File Prior to Encounter   Medication Sig Dispense Refill   • oseltamivir (TAMIFLU) 75 MG Cap 1 CAP BY MOUTH TWICE A DAY X 5 DAYS. 10 Cap 0   • amoxicillin (AMOXIL) 875 MG tablet 1 TAB TWICE A DAY X 10 DAYS. 20 Tab 0   • metformin (GLUCOPHAGE) 500 MG Tab Take 500 mg by mouth 2 times a day, with meals.     • atenolol (TENORMIN) 50 MG Tab Take 50 mg by mouth every day.     • hydrochlorothiazide (HYDRODIURIL) 25 MG TABS Take 12.5 mg by mouth every day.     • sumatriptan (IMITREX) 50 MG TABS Take 50 mg by mouth Once PRN for Migraine.     • lisinopril (PRINIVIL) 20 MG TABS Take 20 mg by mouth every day.         Physical Exam  Hemodynamics  Temp (24hrs), Av.7 °C (98 °F), Min:36.7 °C (98 °F), Max:36.7 °C (98 °F)   Temperature: 36.7 °C (98 °F)  Pulse  Av.5  Min: 71  Max: 74    Blood Pressure: 112/69      Respiratory      Respiration: (!) 25, Pulse Oximetry: 97 %             Physical Exam   Constitutional: He is oriented to person, place, and time. No distress.   HENT:   Head: Normocephalic and atraumatic.   Right Ear: External ear normal.   Left Ear: External ear normal.   Eyes: EOM are normal. Right eye exhibits no discharge. Left eye exhibits no discharge.   Neck: Neck  supple. No JVD present.   Cardiovascular: Normal rate, regular rhythm and normal heart sounds.   Pulmonary/Chest: Effort normal and breath sounds normal. No respiratory distress. He exhibits no tenderness.   Abdominal: Soft. Bowel sounds are normal. He exhibits no distension. There is tenderness.   Musculoskeletal:         General: No edema.   Neurological: He is alert and oriented to person, place, and time. No cranial nerve deficit.   Skin: Skin is dry. He is not diaphoretic. No erythema.   Psychiatric: He has a normal mood and affect. His behavior is normal.   Nursing note and vitals reviewed.    Capillary refill less than 3 seconds, distal pulses intact    Laboratory:  Recent Labs     12/22/19  1737   WBC 6.4   RBC 5.44   HEMOGLOBIN 15.9   HEMATOCRIT 45.9   MCV 84.4   MCH 29.2   MCHC 34.6   RDW 39.0   PLATELETCT 225   MPV 10.1     Recent Labs     12/22/19  1737   SODIUM 135   POTASSIUM 3.6   CHLORIDE 102   CO2 26   GLUCOSE 320*   BUN 8   CREATININE 0.62   CALCIUM 8.8     Recent Labs     12/22/19  1737   ALTSGPT 28   ASTSGOT 14   ALKPHOSPHAT 129*   TBILIRUBIN 0.6   LIPASE 125*   GLUCOSE 320*                 Lab Results   Component Value Date    TROPONINI <0.01 09/11/2012       Imaging  Ct-abdomen-pelvis With    Result Date: 12/22/2019 12/22/2019 7:11 PM HISTORY/REASON FOR EXAM:  abd pain; IV contrast only Right upper quadrant pain. Dark urine and stool TECHNIQUE/EXAM DESCRIPTION:   CT scan of the abdomen and pelvis with contrast. Contrast-enhanced helical scanning was obtained from the diaphragmatic domes through the pubic symphysis following the bolus administration of nonionic contrast without complication. 100 mL of Omnipaque 350 nonionic contrast was administered without complication. Low dose optimization technique was utilized for this CT exam including automated exposure control and adjustment of the mA and/or kV according to patient size. COMPARISON: 9/11/2012. FINDINGS: Lung bases: Minimal bibasilar  opacities, likely atelectasis. Abdomen: The liver is heterogeneous with decreased attenuation. The spleen is unremarkable. The pancreas is unremarkable. The gallbladder demonstrates several stones near the gallbladder neck. No gallbladder wall thickening. The adrenal glands are normal in size. A 1.9 cm benign myelolipoma in the left adrenal gland The kidneys enhance symmetrically. The abdominal aorta is normal in caliber. There is no lymphadenopathy. No bowel wall thickening or bowel dilatation. Pelvis: Unremarkable urinary bladder. Prostate calcifications. No lymph node enlargement, free fluid, or free air in the abdomen or pelvis. No aggressive bone lesions are seen. ___________________________________     1. No CT evidence of acute inflammatory change in the abdomen or pelvis. 2. Cholelithiasis. 3. Hepatic steatosis.    Us-ruq    Result Date: 12/22/2019 12/22/2019 6:49 PM HISTORY/REASON FOR EXAM:  Pain Right upper quadrant pain TECHNIQUE/EXAM DESCRIPTION AND NUMBER OF VIEWS:  Real-time sonography of the liver and biliary tree. COMPARISON: None FINDINGS: The liver measures 18.80 cm. The liver is heterogeneous with increased echogenicity. The echogenicity limits evaluation for liver mass. However, there is no evidence of solid mass lesion. Mobile gallstones are present. The gallbladder wall thickness measures 0.44 cm. There is no pericholecystic fluid. The common duct measures 0.51 cm in diameter. The visualized pancreas is unremarkable. The visualized aorta is normal in caliber. Intrahepatic IVC is patent. The portal vein is patent with hepatopetal flow. The MPV measures 1.1 cm. The right kidney measures 13.8 cm. The right kidney has normal cortical size and echotexture. There is no hydronephrosis or renal calculi. There is no ascites.     Limited exam due to body habitus. 1. Cholelithiasis. No sonographic evidence for acute cholecystitis. 2. Echogenic liver parenchyma, likely hepatic steatosis.          Assessment/Plan:  Anticipate that patient will need less than 2 midnights for management of the discussed medical issues.    * Elevated lipase  Assessment & Plan  Suspect early pancreatitis.  Etiology unclear.  Patient no longer drinks alcohol and denies any history of elevated triglycerides.  CT scan of the abdomen shows no inflammatory changes, no ductal dilatation, no cholecystitis but he does have cholelithiasis.  His LFTs are normal with the exception of an elevated alk phos level.  Given his symptoms however, I will check an MRCP to rule out distal obstructing stone.  Pending these results, the patient will need consults with GI and general surgery.  In the meantime, check lipid panel, continue IV fluid hydration, and symptomatic management for pain and nausea.    HTN (hypertension)  Assessment & Plan  Blood pressure well controlled on home Prinivil, hydrochlorothiazide, and atenolol.  Continue to monitor.    T2DM (type 2 diabetes mellitus) (HCC)  Assessment & Plan  Patient will be n.p.o. while treating for suspected early pancreatitis.  Holding long-acting hypoglycemics.  Monitor with Accu-Cheks and cover with insulin sliding scale.  Check HbA1c.      Prophylaxis: Sequential compression devices for DVT prophylaxis, no PPI indicated, bowel protocol as needed

## 2019-12-23 NOTE — PROGRESS NOTES
Assessment completed.  Pt A&Ox4.  Respirations even, unlabored on room air.  Pt complains of 10/10 abdominal pain and nausea, medicated per MAR.  IVF infusing per MAR.  POC discussed: awaiting MRI results; communication board updated.    Bed in locked, lowest position.  Call light and belongings within reach.  Needs met, will continue to monitor.

## 2019-12-23 NOTE — CARE PLAN
Problem: Pain Management  Goal: Pain level will decrease to patient's comfort goal  Outcome: PROGRESSING AS EXPECTED  Intervention: Follow pain managment plan developed in collaboration with patient and Interdisciplinary Team  Note:   Administer prescribed pain meds.     Problem: Infection  Goal: Will remain free from infection  Outcome: PROGRESSING AS EXPECTED  Intervention: Assess signs and symptoms of infection  Note:   Practice aseptic techniques.

## 2019-12-24 ENCOUNTER — PATIENT OUTREACH (OUTPATIENT)
Dept: HEALTH INFORMATION MANAGEMENT | Facility: OTHER | Age: 39
End: 2019-12-24

## 2019-12-24 VITALS
WEIGHT: 275.8 LBS | OXYGEN SATURATION: 94 % | BODY MASS INDEX: 35.39 KG/M2 | RESPIRATION RATE: 18 BRPM | TEMPERATURE: 98.1 F | HEART RATE: 72 BPM | HEIGHT: 74 IN | DIASTOLIC BLOOD PRESSURE: 94 MMHG | SYSTOLIC BLOOD PRESSURE: 140 MMHG

## 2019-12-24 PROBLEM — R10.11 RUQ ABDOMINAL PAIN: Status: ACTIVE | Noted: 2019-12-24

## 2019-12-24 PROBLEM — R74.8 ELEVATED LIPASE: Status: RESOLVED | Noted: 2019-12-22 | Resolved: 2019-12-24

## 2019-12-24 LAB
ALBUMIN SERPL BCP-MCNC: 3.4 G/DL (ref 3.2–4.9)
ALBUMIN/GLOB SERPL: 1.4 G/DL
ALP SERPL-CCNC: 91 U/L (ref 30–99)
ALT SERPL-CCNC: 19 U/L (ref 2–50)
AMYLASE SERPL-CCNC: 35 U/L (ref 20–103)
ANION GAP SERPL CALC-SCNC: 6 MMOL/L (ref 0–11.9)
AST SERPL-CCNC: 14 U/L (ref 12–45)
BILIRUB SERPL-MCNC: 1 MG/DL (ref 0.1–1.5)
BUN SERPL-MCNC: 5 MG/DL (ref 8–22)
CALCIUM SERPL-MCNC: 8.1 MG/DL (ref 8.5–10.5)
CHLORIDE SERPL-SCNC: 105 MMOL/L (ref 96–112)
CO2 SERPL-SCNC: 28 MMOL/L (ref 20–33)
CREAT SERPL-MCNC: 0.58 MG/DL (ref 0.5–1.4)
ERYTHROCYTE [DISTWIDTH] IN BLOOD BY AUTOMATED COUNT: 39.3 FL (ref 35.9–50)
GLOBULIN SER CALC-MCNC: 2.4 G/DL (ref 1.9–3.5)
GLUCOSE BLD-MCNC: 105 MG/DL (ref 65–99)
GLUCOSE BLD-MCNC: 109 MG/DL (ref 65–99)
GLUCOSE SERPL-MCNC: 120 MG/DL (ref 65–99)
HCT VFR BLD AUTO: 43 % (ref 42–52)
HGB BLD-MCNC: 14.7 G/DL (ref 14–18)
LIPASE SERPL-CCNC: 36 U/L (ref 11–82)
MCH RBC QN AUTO: 29.1 PG (ref 27–33)
MCHC RBC AUTO-ENTMCNC: 34.2 G/DL (ref 33.7–35.3)
MCV RBC AUTO: 85 FL (ref 81.4–97.8)
PLATELET # BLD AUTO: 219 K/UL (ref 164–446)
PMV BLD AUTO: 10.2 FL (ref 9–12.9)
POTASSIUM SERPL-SCNC: 3.5 MMOL/L (ref 3.6–5.5)
PROT SERPL-MCNC: 5.8 G/DL (ref 6–8.2)
RBC # BLD AUTO: 5.06 M/UL (ref 4.7–6.1)
SODIUM SERPL-SCNC: 139 MMOL/L (ref 135–145)
WBC # BLD AUTO: 9.8 K/UL (ref 4.8–10.8)

## 2019-12-24 PROCEDURE — 85027 COMPLETE CBC AUTOMATED: CPT

## 2019-12-24 PROCEDURE — 700102 HCHG RX REV CODE 250 W/ 637 OVERRIDE(OP): Performed by: NURSE PRACTITIONER

## 2019-12-24 PROCEDURE — A9270 NON-COVERED ITEM OR SERVICE: HCPCS | Performed by: FAMILY MEDICINE

## 2019-12-24 PROCEDURE — 83690 ASSAY OF LIPASE: CPT

## 2019-12-24 PROCEDURE — 700102 HCHG RX REV CODE 250 W/ 637 OVERRIDE(OP): Performed by: HOSPITALIST

## 2019-12-24 PROCEDURE — A9270 NON-COVERED ITEM OR SERVICE: HCPCS | Performed by: HOSPITALIST

## 2019-12-24 PROCEDURE — 82150 ASSAY OF AMYLASE: CPT

## 2019-12-24 PROCEDURE — 82962 GLUCOSE BLOOD TEST: CPT

## 2019-12-24 PROCEDURE — A9270 NON-COVERED ITEM OR SERVICE: HCPCS | Performed by: NURSE PRACTITIONER

## 2019-12-24 PROCEDURE — 700102 HCHG RX REV CODE 250 W/ 637 OVERRIDE(OP): Performed by: FAMILY MEDICINE

## 2019-12-24 PROCEDURE — 306637 HCHG MISC ORTHO ITEM RC 0274

## 2019-12-24 PROCEDURE — 99238 HOSP IP/OBS DSCHRG MGMT 30/<: CPT | Performed by: INTERNAL MEDICINE

## 2019-12-24 PROCEDURE — 80053 COMPREHEN METABOLIC PANEL: CPT

## 2019-12-24 RX ORDER — POTASSIUM CHLORIDE 20 MEQ/1
40 TABLET, EXTENDED RELEASE ORAL ONCE
Status: COMPLETED | OUTPATIENT
Start: 2019-12-24 | End: 2019-12-24

## 2019-12-24 RX ORDER — OXYCODONE HYDROCHLORIDE 5 MG/1
5 TABLET ORAL EVERY 6 HOURS PRN
Qty: 12 TAB | Refills: 0 | Status: SHIPPED | OUTPATIENT
Start: 2019-12-24 | End: 2019-12-27

## 2019-12-24 RX ORDER — ONDANSETRON 4 MG/1
4 TABLET, ORALLY DISINTEGRATING ORAL EVERY 4 HOURS PRN
Qty: 10 TAB | Refills: 0 | Status: SHIPPED | OUTPATIENT
Start: 2019-12-24 | End: 2023-09-22

## 2019-12-24 RX ADMIN — LISINOPRIL 20 MG: 20 TABLET ORAL at 05:50

## 2019-12-24 RX ADMIN — HYDROCHLOROTHIAZIDE 12.5 MG: 12.5 TABLET ORAL at 05:50

## 2019-12-24 RX ADMIN — ATENOLOL 50 MG: 50 TABLET ORAL at 05:51

## 2019-12-24 RX ADMIN — ACETAMINOPHEN, ASPIRIN AND CAFFEINE 2 TABLET: 250; 250; 65 TABLET, FILM COATED ORAL at 00:19

## 2019-12-24 RX ADMIN — POTASSIUM CHLORIDE 40 MEQ: 1500 TABLET, EXTENDED RELEASE ORAL at 09:19

## 2019-12-24 NOTE — DISCHARGE SUMMARY
Discharge Summary    CHIEF COMPLAINT ON ADMISSION  Chief Complaint   Patient presents with   • RUQ Pain       Reason for Admission  ems      Admission Date  12/22/2019    CODE STATUS  Full Code    HPI & HOSPITAL COURSE  This is a 39 y.o. male with a past medical history of diabetes, hyperlipidemia, hypertension, migraine here with complaints of right upper quadrant abdominal pain and tenderness ongoing for approximately 2 days prior to admission.    On admission he was noted to have an elevated lipase of 125.  Right upper quadrant ultrasound did reveal cholelithiasis but no evidence of acute cholecystitis.  CT of the abdomen was negative for acute abnormalities.  MRCP revealed a normal CBD size with no choledocholithiasis.  HIDA scan was ordered and patient has been awaiting this test.  He was initiated on IV hydration and has been monitored in the hospital.  His lipase levels are now within normal limits.  His LFTs have remained within normal limits.  He has remained afebrile with no leukocytosis and no other evidence of support infection.  He does continue to exhibit right upper quadrant pain and tenderness.  His abdomen does remain soft.  He does not have nausea, vomiting, or diarrhea.  Due to a high volume of patients scheduled for HIDA scan, the patient was recommended to remain in the hospital until this test can be completed to rule out gallbladder abnormalities.  The patient currently refuses to remain in the hospital as he wishes to return home to be with his children for [place].  His HIDA scan has been scheduled as an outpatient for 12/26/2019.  The patient currently is hemodynamically stable and is not receiving acute medical intervention.  He will be discharged home with a limited supply of narcotics for his pain.  He is recommended to refrain from eating foods high in fat until further work-up of his gallbladder can be performed.  He is aware of his outpatient HIDA scan appointment.  The patient will  be discharged at this time and is recommended to return to the ED if he has worsening of his abdominal pain, fever, bloody stools, altered mental status, or altered level of consciousness.       Therefore, he is discharged in good and stable condition to home with close outpatient follow-up.    Discharge Date  12/24/2019    FOLLOW UP ITEMS POST DISCHARGE  -HIDA scan scheduled for 12/26/2019 at 11:30 AM  -Take oxycodone as needed for acute pain.  -Continue low-fat diet.    DISCHARGE DIAGNOSES  Principal Problem (Resolved):    Elevated lipase POA: Yes  Active Problems:    T2DM (type 2 diabetes mellitus) (HCC) POA: Yes    HTN (hypertension) POA: Yes    Class 2 severe obesity with serious comorbidity in adult ([facility]) POA: Yes    RUQ abdominal pain POA: Unknown      FOLLOW UP  Future Appointments   Date Time Provider Department Center   12/26/2019 11:30 AM [facility]     Follow-up with PCP within 1 week.    MEDICATIONS ON DISCHARGE     Medication List      START taking these medications      Instructions   ondansetron 4 MG Tbdp  Commonly known as:  ZOFRAN ODT   Take 1 Tab by mouth every four hours as needed for Nausea (give PO if no IV route available).  Dose:  4 mg     oxyCODONE immediate-release 5 MG Tabs  Commonly known as:  ROXICODONE   Take 1 Tab by mouth every 6 hours as needed for Severe Pain for up to 3 days.  Dose:  5 mg        CONTINUE taking these medications      Instructions   atenolol 50 MG Tabs  Commonly known as:  TENORMIN   Take 50 mg by mouth every day.  Dose:  50 mg     hydroCHLOROthiazide 25 MG Tabs  Commonly known as:  HYDRODIURIL   Take 12.5 mg by mouth every day.  Dose:  12.5 mg     lisinopril 20 MG Tabs  Commonly known as:  PRINIVIL   Take 20 mg by mouth every day.  Dose:  20 mg     metFORMIN  MG Tb24  Commonly known as:  GLUCOPHAGE XR   Take 500 mg by mouth every day.  Dose:  500 mg     vitamin D 1000 UNIT Tabs  Commonly known as:  cholecalciferol   Take 4,000 Units by  mouth every day.  Dose:  4,000 Units        STOP taking these medications    atorvastatin 20 MG Tabs  Commonly known as:  LIPITOR            Allergies  No Known Allergies    DIET  Low-fat diet.    ACTIVITY  As tolerated.  Weight bearing as tolerated    LABORATORY  Lab Results   Component Value Date    SODIUM 139 12/24/2019    POTASSIUM 3.5 (L) 12/24/2019    CHLORIDE 105 12/24/2019    CO2 28 12/24/2019    GLUCOSE 120 (H) 12/24/2019    BUN 5 (L) 12/24/2019    CREATININE 0.58 12/24/2019        Lab Results   Component Value Date    WBC 9.8 12/24/2019    HEMOGLOBIN 14.7 12/24/2019    HEMATOCRIT 43.0 12/24/2019    PLATELETCT 219 12/24/2019

## 2019-12-24 NOTE — PROGRESS NOTES
IV removed. Discharge instructions provided and patient verbalizes understanding. Patient states that all question have been answered. Copy of discharge provided to patient and signed. Prescription: sent to pharmacy of choice and provided in paper. Patient states that all personal items are in possess. Patient awaiting transportation, charge RN notified.

## 2019-12-24 NOTE — PROGRESS NOTES
Pt reported to Rn that he has had 10/10 headache for most of the day. Per pt he noted that the headache started shortly after administration of the IV fluids. Pt with NS running at 250mL/hr. Pt denies changes in vision. Fluids paused for time being. MD Daniel made aware. Orders placed to decrease fluid rate to 75mL/hr. Will monitor.

## 2019-12-24 NOTE — DISCHARGE INSTRUCTIONS
Discharge Instructions    Discharged to home by car with self. Discharged via ambulance, hospital escort: Yes.  Special equipment needed: Not Applicable    Be sure to schedule a follow-up appointment with your primary care doctor or any specialists as instructed.     Discharge Plan:   Diet Plan: Discussed  Activity Level: Discussed  Smoking Cessation Offered: Patient Counseled  Confirmed Follow up Appointment: Appointment Scheduled  Confirmed Symptoms Management: Discussed  Medication Reconciliation Updated: Yes  Influenza Vaccine Indication: Not indicated: Previously immunized this influenza season and > 8 years of age    I understand that a diet low in cholesterol, fat, and sodium is recommended for good health. Unless I have been given specific instructions below for another diet, I accept this instruction as my diet prescription.   Other diet: heart healthy diet    Special Instructions: None    · Is patient discharged on Warfarin / Coumadin?   No     Acute Pancreatitis  Introduction  Acute pancreatitis is a condition in which the pancreas suddenly gets irritated and swollen (has inflammation). The pancreas is a large gland behind the stomach. It makes enzymes that help to digest food. The pancreas also makes hormones that help to control your blood sugar. Acute pancreatitis happens when the enzymes attack the pancreas and damage it. Most attacks last a couple of days and can cause serious problems.  Follow these instructions at home:  Eating and drinking  · Follow instructions from your doctor about diet. You may need to:  ¨ Avoid alcohol.  ¨ Limit how much fat is in your diet.  · Eat small meals often. Avoid eating big meals.  · Drink enough fluid to keep your pee (urine) clear or pale yellow.  · Do not drink alcohol if it caused your condition.  General instructions  · Take over-the-counter and prescription medicines only as told by your doctor.  · Do not use any tobacco products. These include  cigarettes, chewing tobacco, and e-cigarettes. If you need help quitting, ask your doctor.  · Get plenty of rest.  · If directed, check your blood sugar at home as told by your doctor.  · Keep all follow-up visits as told by your doctor. This is important.  Contact a doctor if:  · You do not get better as quickly as expected.  · You have new symptoms.  · Your symptoms get worse.  · You have lasting pain or weakness.  · You continue to feel sick to your stomach (nauseous).  · You get better and then you have another pain attack.  · You have a fever.  Get help right away if:  · You cannot eat or keep fluids down.  · Your pain becomes very bad.  · Your skin or the white part of your eyes turns yellow (jaundice).  · You throw up (vomit).  · You feel dizzy or you pass out (faint).  · Your blood sugar is high (over 300 mg/dL).  This information is not intended to replace advice given to you by your health care provider. Make sure you discuss any questions you have with your health care provider.  Document Released: 06/05/2009 Document Revised: 05/25/2017 Document Reviewed: 09/20/2016  © 2017 [employer]      Depression / Suicide Risk    As you are discharged from this [facility] facility, it is important to learn how to keep safe from harming yourself.    Recognize the warning signs:  · Abrupt changes in personality, positive or negative- including increase in energy   · Giving away possessions  · Change in eating patterns- significant weight changes-  positive or negative  · Change in sleeping patterns- unable to sleep or sleeping all the time   · Unwillingness or inability to communicate  · Depression  · Unusual sadness, discouragement and loneliness  · Talk of wanting to die  · Neglect of personal appearance   · Rebelliousness- reckless behavior  · Withdrawal from people/activities they love  · Confusion- inability to concentrate     If you or a loved one observes any of these behaviors or has concerns about self-harm,  here's what you can do:  · Talk about it- your feelings and reasons for harming yourself  · Remove any means that you might use to hurt yourself (examples: pills, rope, extension cords, firearm)  · Get professional help from the community (Mental Health, Substance Abuse, psychological counseling)  · Do not be alone:Call your Safe Contact- someone whom you trust who will be there for you.  · Call your local CRISIS HOTLINE 834-4318 or 210-131-4996  · Call your local Children's Mobile Crisis Response Team [place] (407) 478-3829 or www.Exara  · Call the toll free National Suicide Prevention Hotlines   · National Suicide Prevention Lifeline 242-309-GRBD (1936)  · National Hope Line Network 800-SUICIDE (311-6446)

## 2019-12-24 NOTE — PROGRESS NOTES
Pt still c/o headache. Pt states he does have a history of migraines. MD Daniel paged and made aware. Orders placed for Excedrin ONE TIME dose. Will monitor.

## 2019-12-26 ENCOUNTER — HOSPITAL ENCOUNTER (OUTPATIENT)
Dept: RADIOLOGY | Facility: MEDICAL CENTER | Age: 39
End: 2019-12-26
Attending: INTERNAL MEDICINE
Payer: MEDICAID

## 2019-12-26 ENCOUNTER — HOSPITAL ENCOUNTER (OUTPATIENT)
Dept: RADIOLOGY | Facility: MEDICAL CENTER | Age: 39
End: 2019-12-26
Attending: NURSE PRACTITIONER
Payer: MEDICAID

## 2019-12-26 DIAGNOSIS — R10.12 LEFT UPPER QUADRANT PAIN: ICD-10-CM

## 2019-12-26 PROCEDURE — A9537 TC99M MEBROFENIN: HCPCS

## 2021-04-30 ENCOUNTER — NON-PROVIDER VISIT (OUTPATIENT)
Dept: URGENT CARE | Facility: PHYSICIAN GROUP | Age: 41
End: 2021-04-30

## 2021-04-30 DIAGNOSIS — Z02.1 PRE-EMPLOYMENT DRUG SCREENING: ICD-10-CM

## 2021-04-30 LAB
AMP AMPHETAMINE: NORMAL
COC COCAINE: NORMAL
INT CON NEG: NORMAL
INT CON POS: NORMAL
MET METHAMPHETAMINES: NORMAL
OPI OPIATES: NORMAL
PCP PHENCYCLIDINE: NORMAL
POC DRUG COMMENT 753798-OCCUPATIONAL HEALTH: NEGATIVE
THC: NORMAL

## 2021-04-30 PROCEDURE — 80305 DRUG TEST PRSMV DIR OPT OBS: CPT | Performed by: FAMILY MEDICINE

## 2021-07-08 ENCOUNTER — NON-PROVIDER VISIT (OUTPATIENT)
Dept: URGENT CARE | Facility: PHYSICIAN GROUP | Age: 41
End: 2021-07-08

## 2021-07-08 DIAGNOSIS — Z02.1 PRE-EMPLOYMENT DRUG SCREENING: ICD-10-CM

## 2021-07-08 PROCEDURE — 80305 DRUG TEST PRSMV DIR OPT OBS: CPT | Performed by: FAMILY MEDICINE

## 2022-05-21 ENCOUNTER — NON-PROVIDER VISIT (OUTPATIENT)
Dept: URGENT CARE | Facility: PHYSICIAN GROUP | Age: 42
End: 2022-05-21

## 2022-05-21 DIAGNOSIS — Z02.1 PRE-EMPLOYMENT DRUG SCREENING: ICD-10-CM

## 2022-05-21 PROCEDURE — 80305 DRUG TEST PRSMV DIR OPT OBS: CPT | Performed by: FAMILY MEDICINE

## 2022-05-22 LAB
AMP AMPHETAMINE: NORMAL
COC COCAINE: NORMAL
INT CON NEG: NORMAL
INT CON POS: NORMAL
MET METHAMPHETAMINES: NORMAL
OPI OPIATES: NORMAL
PCP PHENCYCLIDINE: NORMAL
POC DRUG COMMENT 753798-OCCUPATIONAL HEALTH: NORMAL
THC: NORMAL

## 2022-08-03 ENCOUNTER — OFFICE VISIT (OUTPATIENT)
Dept: URGENT CARE | Facility: PHYSICIAN GROUP | Age: 42
End: 2022-08-03

## 2022-08-03 VITALS
WEIGHT: 273 LBS | BODY MASS INDEX: 35.04 KG/M2 | RESPIRATION RATE: 16 BRPM | HEIGHT: 74 IN | OXYGEN SATURATION: 95 % | DIASTOLIC BLOOD PRESSURE: 80 MMHG | TEMPERATURE: 97.9 F | SYSTOLIC BLOOD PRESSURE: 140 MMHG | HEART RATE: 104 BPM

## 2022-08-03 DIAGNOSIS — Z02.1 ENCOUNTER FOR PRE-EMPLOYMENT EXAMINATION: ICD-10-CM

## 2022-08-03 DIAGNOSIS — Z02.1 PRE-EMPLOYMENT DRUG SCREENING: ICD-10-CM

## 2022-08-03 PROCEDURE — 8915 PR COMPREHENSIVE PHYSICAL: Performed by: PHYSICIAN ASSISTANT

## 2022-08-03 PROCEDURE — 8907 PR URINE COLLECT ONLY: Performed by: PHYSICIAN ASSISTANT

## 2022-08-03 NOTE — PROGRESS NOTES
"Subjective:   Burke Gorman is a 41 y.o. male who presents for Employment Physical ([employer] employment physical 40lb lift test)      HPI  The patient presents to the Urgent Care for preemployment physical examination.  History of diabetes.  He has a PCP appointment on 08/16.  He has been monitoring his blood sugar levels which have been stable he states.  He has been monitoring his BP.  Otherwise, patient states he feels he has been in good health and is asymptomatic.  He states he has prescription glasses at home and he did not bring them with him today during the exam.        Medications:    • atenolol Tabs  • hydroCHLOROthiazide Tabs  • lisinopril Tabs  • metFORMIN ER Tb24  • ondansetron Tbdp  • vitamin D Tabs    Allergies: Patient has no known allergies.    Problem List: Burke Gorman does not have any pertinent problems on file.    Surgical History:  Past Surgical History:   Procedure Laterality Date   • INCISION AND DRAINAGE ORTHOPEDIC  2004    right leg   • APPENDECTOMY         Past Social Hx: Burke Gorman  reports that he has never smoked. He has never used smokeless tobacco. He reports current alcohol use. He reports that he does not use drugs.     Past Family Hx:  Burke Gorman family history includes Alcohol/Drug in his brother and father; Diabetes in his father and mother.     Problem list, medications, and allergies reviewed by myself today in Epic.     Objective:     BP (!) 140/80   Pulse (!) 104   Temp 36.6 °C (97.9 °F)   Resp 16   Ht 1.88 m (6' 2\")   Wt 124 kg (273 lb)   SpO2 95%   BMI 35.05 kg/m²     Physical Exam    See preemployment physical examination form scanned into chart    Diagnosis and associated orders:     1. Encounter for pre-employment examination       Comments/MDM:     • See preemployment physical examination form scanned into chart.           Please note that this dictation was created using voice recognition software. I have made a reasonable attempt to correct obvious errors, but I " expect that there are errors of grammar and possibly content that I did not discover before finalizing the note.    This note was electronically signed by Lux Rodriguez PA-C

## 2022-10-05 ENCOUNTER — NON-PROVIDER VISIT (OUTPATIENT)
Dept: URGENT CARE | Facility: PHYSICIAN GROUP | Age: 42
End: 2022-10-05

## 2022-10-05 DIAGNOSIS — Z02.1 PRE-EMPLOYMENT DRUG SCREENING: ICD-10-CM

## 2022-10-05 PROCEDURE — 80305 DRUG TEST PRSMV DIR OPT OBS: CPT | Performed by: STUDENT IN AN ORGANIZED HEALTH CARE EDUCATION/TRAINING PROGRAM

## 2023-02-08 ENCOUNTER — NON-PROVIDER VISIT (OUTPATIENT)
Dept: URGENT CARE | Facility: PHYSICIAN GROUP | Age: 43
End: 2023-02-08

## 2023-02-08 DIAGNOSIS — Z02.1 PRE-EMPLOYMENT DRUG SCREENING: ICD-10-CM

## 2023-02-08 PROCEDURE — 80305 DRUG TEST PRSMV DIR OPT OBS: CPT | Performed by: FAMILY MEDICINE

## 2023-08-09 ENCOUNTER — NON-PROVIDER VISIT (OUTPATIENT)
Dept: URGENT CARE | Facility: PHYSICIAN GROUP | Age: 43
End: 2023-08-09

## 2023-08-09 DIAGNOSIS — Z02.1 PRE-EMPLOYMENT DRUG SCREENING: ICD-10-CM

## 2023-08-09 PROCEDURE — 80305 DRUG TEST PRSMV DIR OPT OBS: CPT | Performed by: PHYSICIAN ASSISTANT

## 2023-09-22 ENCOUNTER — APPOINTMENT (OUTPATIENT)
Dept: RADIOLOGY | Facility: IMAGING CENTER | Age: 43
End: 2023-09-22
Attending: FAMILY MEDICINE
Payer: OTHER MISCELLANEOUS

## 2023-09-22 ENCOUNTER — OCCUPATIONAL MEDICINE (OUTPATIENT)
Dept: URGENT CARE | Facility: PHYSICIAN GROUP | Age: 43
End: 2023-09-22
Payer: OTHER MISCELLANEOUS

## 2023-09-22 VITALS
HEIGHT: 74 IN | SYSTOLIC BLOOD PRESSURE: 132 MMHG | RESPIRATION RATE: 18 BRPM | WEIGHT: 275 LBS | OXYGEN SATURATION: 97 % | DIASTOLIC BLOOD PRESSURE: 82 MMHG | TEMPERATURE: 97.8 F | BODY MASS INDEX: 35.29 KG/M2 | HEART RATE: 78 BPM

## 2023-09-22 DIAGNOSIS — S89.92XA INJURY OF LEFT KNEE, INITIAL ENCOUNTER: ICD-10-CM

## 2023-09-22 DIAGNOSIS — S80.02XA CONTUSION OF LEFT KNEE, INITIAL ENCOUNTER: ICD-10-CM

## 2023-09-22 DIAGNOSIS — S59.901A INJURY OF RIGHT ELBOW, INITIAL ENCOUNTER: ICD-10-CM

## 2023-09-22 DIAGNOSIS — S50.01XA CONTUSION OF RIGHT ELBOW, INITIAL ENCOUNTER: ICD-10-CM

## 2023-09-22 PROCEDURE — 73564 X-RAY EXAM KNEE 4 OR MORE: CPT | Mod: TC,FY,LT | Performed by: RADIOLOGY

## 2023-09-22 PROCEDURE — 73080 X-RAY EXAM OF ELBOW: CPT | Mod: TC,FY,RT | Performed by: RADIOLOGY

## 2023-09-22 PROCEDURE — 3079F DIAST BP 80-89 MM HG: CPT | Performed by: FAMILY MEDICINE

## 2023-09-22 PROCEDURE — 99213 OFFICE O/P EST LOW 20 MIN: CPT | Performed by: FAMILY MEDICINE

## 2023-09-22 PROCEDURE — 1125F AMNT PAIN NOTED PAIN PRSNT: CPT | Performed by: FAMILY MEDICINE

## 2023-09-22 PROCEDURE — 3075F SYST BP GE 130 - 139MM HG: CPT | Performed by: FAMILY MEDICINE

## 2023-09-22 ASSESSMENT — PAIN SCALES - GENERAL: PAINLEVEL: 8=MODERATE-SEVERE PAIN

## 2023-09-22 NOTE — LETTER
[facility]  [address] [place] [address] [place] [address]  Phone:  [phone] - Fax:  [phone]   [facility] Progress Report and Disability Certification  Date of Service: 9/22/2023   No Show:  No  Date / Time of Next Visit:  He does not feel need to return. He will return if anything worsens or does not gradually improve with rest and time.   Claim Information   Patient Name: Burke Gorman  Claim Number:     Employer: [employer]  Date of Injury: 9/21/2023     Insurer / TPA: [employer] - [employer]pa  ID / SSN:     Occupation:   Diagnosis: Diagnoses of Injury of right elbow, initial encounter, Injury of left knee, initial encounter, Contusion of right elbow, initial encounter, and Contusion of left knee, initial encounter were pertinent to this visit.    Medical Information   Related to Industrial Injury? Yes    Subjective Complaints:  DOI: 9/21/23. Was securing loose drums in trailer, foot slipped out of the back of the trailer, fell, hurt right elbow and left knee - impacted onto concrete. Took Ibuprofen - helped some. Hurts most in medial aspect of right elbow with movements and some numb feeling in right elbow. Left knee hurts most laterally and also has some numbness feeling. No previous problems with right elbow or left knee.   Objective Findings: Right elbow has tenderness in medial aspect with range of motions. Left knee is tender to palpation anterolateral aspect. Feels numb in right elbow and left knee.   Pre-Existing Condition(s): None.   Assessment:   Initial Visit    Status: Discharged /  MMI  Comments:He does not feel need to return. He will return if anything worsens or does not gradually improve with rest and time.  Permanent Disability:No    Plan: Medication  Comments:May take over-the-counter Ibuprofen (Motrin or Advil) as needed for pain and swelling for anti-inflammatory effect.    Diagnostics:  X-ray  Comments:Right elbow x-rays: No acute fracture or malalignment of the right elbow. There is mild diffuse degenerative change. Left knee x-rays: No acute fracture or malalignment. Possible mild joint effusion. No acute displaced fracture.    Comments:       Disability Information   Status: Released to Full Duty    From:     Through:   Restrictions are:     Physical Restrictions   Sitting:    Standing:    Stooping:    Bending:      Squatting:    Walking:    Climbing:    Pushing:      Pulling:    Other:    Reaching Above Shoulder (L):   Reaching Above Shoulder (R):       Reaching Below Shoulder (L):    Reaching Below Shoulder (R):      Not to exceed Weight Limits   Carrying(hrs):   Weight Limit(lb):   Lifting(hrs):   Weight  Limit(lb):     Comments:      Repetitive Actions   Hands: i.e. Fine Manipulations from Grasping:     Feet: i.e. Operating Foot Controls:     Driving / Operate Machinery:     Health Care Provider’s Original or Electronic Signature  Hugh Warner M.D. Health Care Provider’s Original or Electronic Signature    Chandler Saucedo DO MPH     Clinic Name / Location: [address] [address] Clinic Phone Number: Dept: [phone]   Appointment Time: 4:15 Pm Visit Start Time: 4:34 PM   Check-In Time:  4:23 Pm Visit Discharge Time:  5:42 PM   Original-Treating Physician or Chiropractor    Page 2-Insurer/TPA    Page 3-Employer    Page 4-Employee

## 2023-09-22 NOTE — LETTER
"EMPLOYEE’S CLAIM FOR COMPENSATION/ REPORT OF INITIAL TREATMENT  FORM C-4  PLEASE TYPE OR PRINT    EMPLOYEE’S CLAIM - PROVIDE ALL INFORMATION REQUESTED   First Name  Burke Last Name  Sherif Birthdate                    1980                Sex  male Claim Number (Insurer’s Use Only)   Home Address  [address]  Age  42 y.o. Height  1.88 m (6' 2\") Weight  125 kg (275 lb) [facility]     City                                               [place]                         State  [place] Zip  [address] Telephone  There are no phone numbers on file.   Mailing Address  [address]  [facility] Zip  [address] Primary Language Spoken  English    INSURER   THIRD-PARTY     [employer] Workers Compensation - Tpa   Employee's Occupation (Job Title) When Injury or Occupational Disease Occurred      Employer's Name/Company Name  [employer]  Telephone  [phone]    Office Mail Address (Number and Street)  [address]        Date of Injury  9/21/2023               Hours Injury  10:00 PM Date Employer Notified  9/21/2023 Last Day of Work after Injury or Occupational Disease  9/21/2023 Supervisor to Whom Injury     Reported  [place]   Address or Location of Accident (if applicable)  Work [1]   What were you doing at the time of accident? (if applicable)  Securing loose drums in trailer    How did this injury or occupational disease occur? (Be specific and answer in detail. Use additional sheet if necessary)  Bad footing getting out of trailer and foot slipped out of the back of the trailer   If you believe that you have an occupational disease, when did you first have knowledge of the disability and its relationship to your employment?  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  I HEREBY AUTHORIZE ANY PHYSICIAN, CHIROPRACTOR, SURGEON, PRACTITIONER OR ANY OTHER PERSON, ANY HOSPITAL, INCLUDING [facility] OR [facility], ANY  MEDICAL SERVICE ORGANIZATION, ANY INSURANCE COMPANY, OR OTHER INSTITUTION OR ORGANIZATION TO RELEASE TO EACH OTHER, ANY MEDICAL OR OTHER INFORMATION, INCLUDING BENEFITS PAID OR PAYABLE, PERTINENT TO THIS INJURY OR DISEASE, EXCEPT INFORMATION RELATIVE TO DIAGNOSIS, TREATMENT AND/OR COUNSELING FOR AIDS, PSYCHOLOGICAL CONDITIONS, ALCOHOL OR CONTROLLED SUBSTANCES, FOR WHICH I MUST GIVE SPECIFIC AUTHORIZATION.  A PHOTOSTAT OF THIS AUTHORIZATION SHALL BE VALID AS THE ORIGINAL.       Date   Place Employee’s Original or  *Electronic Signature   THIS REPORT MUST BE COMPLETED AND MAILED WITHIN 3 WORKING DAYS OF TREATMENT   Place  [facility]  Name of Facility  [facility]   Date  9/22/2023 Diagnosis and Description of Injury or Occupational Disease  (S59.901A) Injury of right elbow, initial encounter  (S89.92XA) Injury of left knee, initial encounter  (S50.01XA) Contusion of right elbow, initial encounter  (S80.02XA) Contusion of left knee, initial encounter Is there evidence that the injured employee was under the influence of alcohol and/or another controlled substance at the time of accident?  ? No ? Yes (if yes, please explain)   Hour  4:34 PM   Diagnoses of Injury of right elbow, initial encounter, Injury of left knee, initial encounter, Contusion of right elbow, initial encounter, and Contusion of left knee, initial encounter were pertinent to this visit. No   Treatment  May take over-the-counter Ibuprofen (Motrin or Advil) as needed for pain and swelling for anti-inflammatory effect.  Have you advised the patient to remain off work five  days or     more?    X-Ray Findings  Negative  Comments:Right elbow x-rays: No acute fracture or malalignment of the right elbow. There is mild diffuse degenerative change. Left knee x-rays: No acute fracture or malalignment. Possible mild joint effusion. No acute displaced fracture.   ? Yes Indicate dates:   From   To      From information given by the employee, together with medical evidence, can        you directly connect this injury or occupational disease as job incurred?  Yes ? No If no, is the injured employee capable of:  ? full duty  Yes ? modified duty      Is additional medical care by a physician indicated?  No  Comments:He does not feel need to return. He will return if anything worsens or does not gradually improve with rest and time. If modified duty, specify any limitations / restrictions      Do you know of any previous injury or disease contributing to this condition or occupational disease?  ? Yes ? No (Explain if yes)                          No   Date  9/22/2023 Print Health Care Provider's  Name  Hugh Warner M.D. I certify that the employer’s copy of  this form was delivered to the employer on:   Address  [address] Insurer’s Use Only     [place] Zip  [address]    Provider’s Tax ID Number  [id number] Telephone  Dept: [phone]             Health Care Provider’s Original or Electronic Signature  e-HUGH Larson M.D. Degree (MD,DO, DC,PA-C,APRN)  MD      * Complete and attach Release of Information (Form C-4A) when injured employee signs C-4 Form electronically  ORIGINAL - TREATING HEALTHCARE PROVIDER PAGE 2 - INSURER/TPA PAGE 3 - EMPLOYER PAGE 4 - EMPLOYEE             Form C-4 (rev.08/21)           BRIEF DESCRIPTION OF RIGHTS AND BENEFITS  (Pursuant to [place] 616C.050)    Notice of Injury or Occupational Disease (Incident Report Form C-1): If an injury or occupational disease (OD) arises out of and in the course of employment, you must provide written notice  "to your employer as soon as practicable, but no later than 7 days after the accident or OD. Your employer shall maintain a sufficient supply of the required forms.    Claim for Compensation (Form C-4): If medical treatment is sought, the form C-4 is available at the place of initial treatment. A completed \"Claim for Compensation\" (Form C-4) must be filed within 90 days after an accident or OD. The treating physician or chiropractor must, within 3 working days after treatment, complete and mail to the employer, the employer's insurer and third-party , the Claim for Compensation.    Medical Treatment: If you require medical treatment for your on-the-job injury or OD, you may be required to select a physician or chiropractor from a list provided by your workers’ compensation insurer, if it has contracted with an Organization for Managed Care (MCO) or Preferred Provider Organization (PPO) or providers of health care. If your employer has not entered into a contract with an MCO or PPO, you may select a physician or chiropractor from the Panel of Physicians and Chiropractors. Any medical costs related to your industrial injury or OD will be paid by your insurer.    Temporary Total Disability (TTD): If your doctor has certified that you are unable to work for a period of at least 5 consecutive days, or 5 cumulative days in a 20-day period, or places restrictions on you that your employer does not accommodate, you may be entitled to TTD compensation.    Temporary Partial Disability (TPD): If the wage you receive upon reemployment is less than the compensation for TTD to which you are entitled, the insurer may be required to pay you TPD compensation to make up the difference. TPD can only be paid for a maximum of 24 months.    Permanent Partial Disability (PPD): When your medical condition is stable and there is an indication of a PPD as a result of your injury or OD, within 30 days, your insurer must arrange " for an evaluation by a rating physician or chiropractor to determine the degree of your PPD. The amount of your PPD award depends on the date of injury, the results of the PPD evaluation, your age and wage.    Permanent Total Disability (PTD): If you are medically certified by a treating physician or chiropractor as permanently and totally disabled and have been granted a PTD status by your insurer, you are entitled to receive monthly benefits not to exceed 66 2/3% of your average monthly wage. The amount of your PTD payments is subject to reduction if you previously received a lump-sum PPD award.    Vocational Rehabilitation Services: You may be eligible for vocational rehabilitation services if you are unable to return to the job due to a permanent physical impairment or permanent restrictions as a result of your injury or occupational disease.    Transportation and Per Olvin Reimbursement: You may be eligible for travel expenses and per olvin associated with medical treatment.    Reopening: You may be able to reopen your claim if your condition worsens after claim closure.     Appeal Process: If you disagree with a written determination issued by the insurer or the insurer does not respond to your request, you may appeal to the Department of Administration, , by following the instructions contained in your determination letter. You must appeal the determination within 70 days from the date of the determination letter at [address], [address], [place], [place] [address], or [address], [address], [place] [address]. If you disagree with the  decision, you may appeal to the Department of Administration, . You must file your appeal within 30 days from the date of the  decision letter at [address], [address], [place], [place] [address], or [address], [address], [place], [place] [address]. If you disagree with a  decision of an , you may file a petition for judicial review with the District Court. You must do so within 30 days of the Appeal Officer’s decision. You may be represented by an  at your own expense or you may contact the [facility] for possible representation.    [place]  for Injured Workers (NAIW): If you disagree with a  decision, you may request that NAIW represent you without charge at an  Hearing. For information regarding denial of benefits, you may contact the [facility] at: [address], [address], [place] [address], [phone], or [address] [address], [phone]    To File a Complaint with the Division: If you wish to file a complaint with the  of the Division of Industrial Relations (DIR),  please contact the Workers’ Compensation Section, [address], [address], [place], [place] [address], telephone [phone], or [address], [address], [place], [place] [address], telephone [phone].    For assistance with Workers’ Compensation Issues: You may contact the [facility] Office for Consumer Health Assistance, [address], [address], [place], [place] [address], Toll Free 1-538.249.7461, Web site: [email or web address] E-mail: [email or web address]              __________________________________________________________________                                    _________________            Employee Name / Signature                                                                                                                            Date                                                                                                                                                                                                                              D-2 (rev. 10/20)

## 2023-09-22 NOTE — PROGRESS NOTES
Chief Complaint:    Chief Complaint   Patient presents with    Elbow Injury     W/C rt elbow, slipped and fell landed full weight on the elbow x 1 day.  Has hard time gripping and rotating externally        History of Present Illness:    DOI: 9/21/23. Was securing loose drums in trailer, foot slipped out of the back of the trailer, fell, hurt right elbow and left knee - impacted onto concrete. Took Ibuprofen - helped some. Hurts most in medial aspect of right elbow with movements and some numb feeling in right elbow. Left knee hurts most laterally and also has some numbness feeling. No previous problems with right elbow or left knee.      Past Medical History:    Past Medical History:   Diagnosis Date    Diabetes (HCC)     Hypercholesteremia     Hypertension     Migraine     Personal history of venous thrombosis and embolism     2004, right leg     Past Surgical History:    Past Surgical History:   Procedure Laterality Date    INCISION AND DRAINAGE ORTHOPEDIC  2004    right leg    APPENDECTOMY       Social History:    Social History     Socioeconomic History    Marital status: Single     Spouse name: Not on file    Number of children: Not on file    Years of education: Not on file    Highest education level: Not on file   Occupational History    Not on file   Tobacco Use    Smoking status: Never    Smokeless tobacco: Never   Substance and Sexual Activity    Alcohol use: Yes     Comment: 4 beers per week    Drug use: No    Sexual activity: Not on file   Other Topics Concern    Not on file   Social History Narrative    ** Merged History Encounter **          Social Determinants of Health     Financial Resource Strain: Not on file   Food Insecurity: Not on file   Transportation Needs: Not on file   Physical Activity: Not on file   Stress: Not on file   Social Connections: Not on file   Intimate Partner Violence: Not on file   Housing Stability: Not on file     Family History:    Family History   Problem Relation Age of  "Onset    Diabetes Mother     Diabetes Father     Alcohol/Drug Father     Alcohol/Drug Brother      Medications:    Current Outpatient Medications on File Prior to Visit   Medication Sig Dispense Refill    metFORMIN ER (GLUCOPHAGE XR) 500 MG TABLET SR 24 HR Take 500 mg by mouth every day.      vitamin D (CHOLECALCIFEROL) 1000 UNIT Tab Take 4,000 Units by mouth every day.      atenolol (TENORMIN) 50 MG Tab Take 50 mg by mouth every day.      lisinopril (PRINIVIL) 20 MG TABS Take 20 mg by mouth every day.       No current facility-administered medications on file prior to visit.     Allergies:    No Known Allergies      Vitals:    Vitals:    09/22/23 1637   BP: 132/82   Pulse: 78   Resp: 18   Temp: 36.6 °C (97.8 °F)   TempSrc: Temporal   SpO2: 97%   Weight: 125 kg (275 lb)   Height: 1.88 m (6' 2\")       Physical Exam:    Constitutional: Vital signs reviewed. Appears well-developed and well-nourished. No acute distress.   Eyes: Sclera white, conjunctivae clear.  ENT: External ears normal. Hearing normal.  Pulmonary/Chest: Respirations non-labored.  Musculoskeletal: Right elbow has tenderness in medial aspect with range of motions. Left knee is tender to palpation anterolateral aspect.   Neurological: Alert and oriented to person, place, and time. Muscle tone normal. Coordination normal. Feels numb in right elbow and left knee.  Skin: No rashes or lesions. Warm, dry, normal turgor.  Psychiatric: Normal mood and affect. Behavior is normal. Judgment and thought content normal.       Diagnostics:    DX-ELBOW-COMPLETE 3+ RIGHT  Order: [id number]  Status: Final result     Visible [facility] patient: No [facility])     Next appt: None     Dx: Injury of right elbow, initial encounter     0 Result Notes  Details    Reading Physician Reading Date Result Priority   Steffanie Lew M.D.  [phone] 9/22/2023 Urgent Care     Narrative & Impression     9/22/2023 4:59 PM     HISTORY/REASON FOR EXAM:  Fall yesterday with right " elbow pain.        TECHNIQUE/EXAM DESCRIPTION AND NUMBER OF VIEWS:  3 views of the RIGHT elbow.     COMPARISON: None     FINDINGS:  There is no focal soft tissue swelling.     There is no evidence of a joint effusion.     There is no evidence of displaced fracture or dislocation.     There is degenerative spurring of the proximal olecranon and of the radial head and coronoid process.     IMPRESSION:     1.  No acute fracture or malalignment of the right elbow.  2.  There is mild diffuse degenerative change.     DX-KNEE COMPLETE 4+ LEFT  Order: [id number]  Status: Final result     Visible to patient: No (inaccessible in MyChart)     Next appt: None     Dx: Injury of left knee, initial encounter     0 Result Notes  Details    Reading Physician Reading Date Result Priority   Steffanie Lew M.D.  [phone] 2023 Urgent Care     Narrative & Impression     2023 4:59 PM     HISTORY/REASON FOR EXAM:  Fall yesterday with left lateral knee pain.        TECHNIQUE/EXAM DESCRIPTION AND NUMBER OF VIEWS:  4 views of the LEFT knee.     COMPARISON: None     FINDINGS:        There is questionable mild anterior and medial soft tissue swelling. There may be a small suprapatellar joint effusion.     There is no evidence of displaced  fracture or dislocation.     There is minimal degenerative spurring of the tibial spines and the medial compartment as well as posterior patella. There are probably small anterior intra-articular bodies.     IMPRESSION:     1.  No acute fracture or malalignment.  2.  Possible mild joint effusion.  3.  No acute displaced fracture.     I personally reviewed the images. Rad reports reviewed with him and copies of reports to him.      Assessment / Plan & Medical Decision Makin. Injury of right elbow, initial encounter  - DX-ELBOW-COMPLETE 3+ RIGHT; Future    2. Injury of left knee, initial encounter  - DX-KNEE COMPLETE 4+ LEFT; Future    3. Contusion of right elbow, initial encounter    4.  Contusion of left knee, initial encounter       Discussed with him DDX, management options, and risks, benefits, and alternatives to treatment plan agreed upon.    DOI: 9/21/23. Was securing loose drums in trailer, foot slipped out of the back of the trailer, fell, hurt right elbow and left knee - impacted onto concrete. Took Ibuprofen - helped some. Hurts most in medial aspect of right elbow with movements and some numb feeling in right elbow. Left knee hurts most laterally and also has some numbness feeling. No previous problems with right elbow or left knee.    Right elbow has tenderness in medial aspect with range of motions. Left knee is tender to palpation anterolateral aspect. Feels numb in right elbow and left knee.    Right elbow x-rays: No acute fracture or malalignment of the right elbow. There is mild diffuse degenerative change. Left knee x-rays: No acute fracture or malalignment. Possible mild joint effusion. No acute displaced fracture.    Does not want formal work restrictions.     Full Duty.     May take over-the-counter Ibuprofen (Motrin or Advil) as needed for pain and swelling for anti-inflammatory effect.    He does not feel need to return. He will return if anything worsens or does not gradually improve with rest and time.

## 2025-03-13 ENCOUNTER — HOSPITAL ENCOUNTER (EMERGENCY)
Facility: MEDICAL CENTER | Age: 45
End: 2025-03-13
Attending: EMERGENCY MEDICINE
Payer: MEDICAID

## 2025-03-13 VITALS
SYSTOLIC BLOOD PRESSURE: 164 MMHG | BODY MASS INDEX: 35.38 KG/M2 | DIASTOLIC BLOOD PRESSURE: 101 MMHG | WEIGHT: 275.57 LBS | OXYGEN SATURATION: 95 % | HEART RATE: 89 BPM | TEMPERATURE: 97.9 F | RESPIRATION RATE: 14 BRPM

## 2025-03-13 DIAGNOSIS — K08.89 DENTALGIA: ICD-10-CM

## 2025-03-13 DIAGNOSIS — K02.9 DENTAL CARIES: ICD-10-CM

## 2025-03-13 PROCEDURE — 700102 HCHG RX REV CODE 250 W/ 637 OVERRIDE(OP): Performed by: EMERGENCY MEDICINE

## 2025-03-13 PROCEDURE — A9270 NON-COVERED ITEM OR SERVICE: HCPCS | Performed by: EMERGENCY MEDICINE

## 2025-03-13 PROCEDURE — 99282 EMERGENCY DEPT VISIT SF MDM: CPT

## 2025-03-13 RX ORDER — AMOXICILLIN 500 MG/1
500 CAPSULE ORAL 3 TIMES DAILY
Qty: 21 CAPSULE | Refills: 0 | Status: ACTIVE | OUTPATIENT
Start: 2025-03-13 | End: 2025-03-13

## 2025-03-13 RX ORDER — ACETAMINOPHEN 325 MG/1
975 TABLET ORAL ONCE
Status: COMPLETED | OUTPATIENT
Start: 2025-03-13 | End: 2025-03-13

## 2025-03-13 RX ORDER — AMOXICILLIN 500 MG/1
500 CAPSULE ORAL 3 TIMES DAILY
Qty: 21 CAPSULE | Refills: 0 | Status: ACTIVE | OUTPATIENT
Start: 2025-03-13 | End: 2025-03-20

## 2025-03-13 RX ADMIN — IBUPROFEN 800 MG: 600 TABLET, FILM COATED ORAL at 12:26

## 2025-03-13 RX ADMIN — ACETAMINOPHEN 975 MG: 325 TABLET ORAL at 12:22

## 2025-03-13 NOTE — ED PROVIDER NOTES
"ED Provider Note    CHIEF COMPLAINT  Chief Complaint   Patient presents with    Dental Pain     L side, \"on and off for about 3 weeks\"; reports cracked tooth, chills       EXTERNAL RECORDS REVIEWED  PDMP no controlled substances    HPI/ROS  LIMITATION TO HISTORY   Select: : None  OUTSIDE HISTORIAN(S):  None    Burke Gorman is a 44 y.o. male who presents stating that he has left lower dental pain.  He has an obvious dental cavity and he has a dentist in [place] that he would like to follow-up with.    PAST MEDICAL HISTORY   has a past medical history of Diabetes (HCC), Hypercholesteremia, Hypertension, Migraine, and Personal history of venous thrombosis and embolism.    SURGICAL HISTORY   has a past surgical history that includes appendectomy and incision and drainage orthopedic (2004).    FAMILY HISTORY  Family History   Problem Relation Age of Onset    Diabetes Mother     Diabetes Father     Alcohol/Drug Father     Alcohol/Drug Brother        SOCIAL HISTORY  Social History     Tobacco Use    Smoking status: Never    Smokeless tobacco: Never   Vaping Use    Vaping status: Never Used   Substance and Sexual Activity    Alcohol use: Yes     Comment: 4 beers per week    Drug use: No    Sexual activity: Not on file       CURRENT MEDICATIONS  Home Medications       Reviewed by Aggie Cueto R.N. (Registered Nurse) on 03/13/25 at 1044  Med List Status: Not Addressed     Medication Last Dose Status   atenolol (TENORMIN) 50 MG Tab  Active   lisinopril (PRINIVIL) 20 MG TABS  Active   metFORMIN ER (GLUCOPHAGE XR) 500 MG TABLET SR 24 HR  Active   vitamin D (CHOLECALCIFEROL) 1000 UNIT Tab  Active                    ALLERGIES  No Known Allergies    PHYSICAL EXAM  VITAL SIGNS: BP (!) 171/107   Pulse 95   Temp 36.5 °C (97.7 °F) (Temporal)   Resp 16   Wt 125 kg (275 lb 9.2 oz)   SpO2 96%   BMI 35.38 kg/m²      Mouth: The patient has an obvious dental carry with no evidence of abscess that I can drain.  No evidence of " Ludewig's angina.        COURSE & MEDICAL DECISION MAKING    ASSESSMENT, COURSE AND PLAN  Care Narrative:     Patient presents with left lower molar dental caries.  Here the patient was given Tylenol and Motrin p.o.    I will prescribe the patient amoxicillin.  He will follow-up with his dentist.  He will take Tylenol and Motrin for pain.  He will return for worsening symptoms.          ADDITIONAL PROBLEMS MANAGED  Dental caries, dentalgia    DISPOSITION AND DISCUSSIONS  I have discussed management of the patient with the following physicians and KALI's: None    Discussion of management with other \[facility]\"" or appropriate source(s): None     Escalation of care considered, and ultimately not performed:Laboratory analysis and diagnostic imaging    Barriers to care at this time, including but not limited to:  None .     Decision tools and prescription drugs considered including, but not limited to:  Prescribed amoxicillin .    The patient will return for new or worsening symptoms and is stable at the time of discharge.    The patient is referred to a primary physician for blood pressure management, diabetic screening, and for all other preventative health concerns.        DISPOSITION:  Patient will be discharged home in stable condition.    FOLLOW UP:  [facility], Emergency Dept  [address]  [facility] [id number] [phone]    If symptoms worsen      Follow up with your dentist          OUTPATIENT MEDICATIONS:  Current Discharge Medication List        START taking these medications    Details   amoxicillin (AMOXIL) 500 MG Cap Take 1 Capsule by mouth 3 times a day for 7 days.  Qty: 21 Capsule, Refills: 0    Associated Diagnoses: Dental caries               FINAL DIAGNOSIS  1. Dental caries    2. Dentalgia         Electronically signed by: Yariel Stauffer M.D., 3/13/2025 12:02 PM

## 2025-03-13 NOTE — ED NOTES
Discharge instructions provided. Patient verbalized understanding of discharge instructions and to return to the ER if condition worsens. Patient ambulated out of ER without difficulty.

## 2025-03-13 NOTE — ED TRIAGE NOTES
"Chief Complaint   Patient presents with    Dental Pain     L side, \"on and off for about 3 weeks\"; reports cracked tooth, chills     BP (!) 171/107   Pulse 95   Temp 36.5 °C (97.7 °F) (Temporal)   Resp 16   Wt 125 kg (275 lb 9.2 oz)   SpO2 96%   BMI 35.38 kg/m²       "